# Patient Record
Sex: MALE | Race: BLACK OR AFRICAN AMERICAN | NOT HISPANIC OR LATINO | URBAN - METROPOLITAN AREA
[De-identification: names, ages, dates, MRNs, and addresses within clinical notes are randomized per-mention and may not be internally consistent; named-entity substitution may affect disease eponyms.]

---

## 2023-05-12 ENCOUNTER — OFFICE VISIT (OUTPATIENT)
Dept: INTERNAL MEDICINE | Facility: CLINIC | Age: 63
End: 2023-05-12
Payer: MEDICARE

## 2023-05-12 ENCOUNTER — HOSPITAL ENCOUNTER (OUTPATIENT)
Dept: RADIOLOGY | Facility: HOSPITAL | Age: 63
Discharge: HOME | End: 2023-05-12
Payer: MEDICARE

## 2023-05-12 VITALS
OXYGEN SATURATION: 97 % | DIASTOLIC BLOOD PRESSURE: 88 MMHG | HEART RATE: 100 BPM | WEIGHT: 313.69 LBS | SYSTOLIC BLOOD PRESSURE: 124 MMHG | BODY MASS INDEX: 50.41 KG/M2 | HEIGHT: 66 IN

## 2023-05-12 DIAGNOSIS — I10 PRIMARY HYPERTENSION: ICD-10-CM

## 2023-05-12 DIAGNOSIS — M17.0 OSTEOARTHRITIS OF BOTH KNEES, UNSPECIFIED OSTEOARTHRITIS TYPE: ICD-10-CM

## 2023-05-12 DIAGNOSIS — R73.03 PREDIABETES: ICD-10-CM

## 2023-05-12 DIAGNOSIS — Z00.00 ENCOUNTER FOR MEDICAL EXAMINATION TO ESTABLISH CARE: Primary | ICD-10-CM

## 2023-05-12 DIAGNOSIS — E78.5 DYSLIPIDEMIA: ICD-10-CM

## 2023-05-12 DIAGNOSIS — C18.7 MALIGNANT NEOPLASM OF SIGMOID COLON: ICD-10-CM

## 2023-05-12 DIAGNOSIS — Z12.5 ENCOUNTER FOR SCREENING FOR MALIGNANT NEOPLASM OF PROSTATE: ICD-10-CM

## 2023-05-12 DIAGNOSIS — Z00.00 HEALTHCARE MAINTENANCE: ICD-10-CM

## 2023-05-12 DIAGNOSIS — R94.6 BORDERLINE ABNORMAL TFTS: ICD-10-CM

## 2023-05-12 DIAGNOSIS — Z01.30 ENCOUNTER FOR BLOOD PRESSURE EXAMINATION: ICD-10-CM

## 2023-05-12 PROBLEM — E66.01 CLASS 3 SEVERE OBESITY IN ADULT: Status: ACTIVE | Noted: 2023-05-12

## 2023-05-12 PROBLEM — E66.813 CLASS 3 SEVERE OBESITY IN ADULT: Status: ACTIVE | Noted: 2023-05-12

## 2023-05-12 PROCEDURE — 73562 XR KNEE 3 VIEW BILATERAL: ICD-10-PCS | Mod: 26,50,, | Performed by: RADIOLOGY

## 2023-05-12 PROCEDURE — 99203 OFFICE O/P NEW LOW 30 MIN: CPT | Mod: S$PBB,GC,,

## 2023-05-12 PROCEDURE — 73562 X-RAY EXAM OF KNEE 3: CPT | Mod: 26,50,, | Performed by: RADIOLOGY

## 2023-05-12 PROCEDURE — 73562 X-RAY EXAM OF KNEE 3: CPT | Mod: TC,50

## 2023-05-12 PROCEDURE — 99999 PR PBB SHADOW E&M-NEW PATIENT-LVL IV: CPT | Mod: PBBFAC,GC,,

## 2023-05-12 PROCEDURE — 99203 PR OFFICE/OUTPT VISIT, NEW, LEVL III, 30-44 MIN: ICD-10-PCS | Mod: S$PBB,GC,,

## 2023-05-12 PROCEDURE — 99204 OFFICE O/P NEW MOD 45 MIN: CPT | Mod: PBBFAC

## 2023-05-12 PROCEDURE — 99999 PR PBB SHADOW E&M-NEW PATIENT-LVL IV: ICD-10-PCS | Mod: PBBFAC,GC,,

## 2023-05-12 RX ORDER — ACETAMINOPHEN 500 MG
TABLET ORAL
COMMUNITY

## 2023-05-12 RX ORDER — MELOXICAM 7.5 MG/1
7.5 TABLET ORAL DAILY
Qty: 90 TABLET | Refills: 0 | Status: SHIPPED | OUTPATIENT
Start: 2023-05-12 | End: 2023-07-05 | Stop reason: SDUPTHER

## 2023-05-12 RX ORDER — AMLODIPINE BESYLATE 10 MG/1
TABLET ORAL
COMMUNITY
End: 2023-09-25 | Stop reason: SDUPTHER

## 2023-05-12 RX ORDER — LIDOCAINE 50 MG/G
1 PATCH TOPICAL DAILY
Qty: 30 PATCH | Refills: 0 | Status: SHIPPED | OUTPATIENT
Start: 2023-05-12

## 2023-05-12 NOTE — PROGRESS NOTES
[facility]   Clinic Note    Patient Name: Bobby Tavarez   YOB: 1960     SUBJECTIVE:     Chief Complaint: Establish care and bilateral knee pain    History of Present Illness:  Mr. Bobby Tavarez is a 63 y.o. male with history of hypertension and osteoarthritis of bilateral knees, who presents today to establish care with me. Patient previously seen at [facility], has not been seen by a provider in our network. Patient recently retired and is doing generally well, however, complains of significant pain in both of his knees, though worse in the right side. Pt says he has recently put on a significant amount of weight within the last few years prior to [facility]. He also reports some issues with erectile function and low libido. He denies any recent fevers, chills, SOB, chest pain, or n/v/d. His issues are as follows:    HTN: On Amlodipine 10mg, BP has been well controlled on this. Pt has BP cuff at home and reports his home measurements are usually in the 120s/70s. Today his BP is 124/88. Says in the past, when his weight was lower, his BP was controlled without any medication.    OA of bilateral knees: Takes ibuprofen at home for his OA. Long history of working as  with a lot of repetitive motions with his legs and heavy vibrations. Has had x-rays done in the past that I cannot view that had demonstrated degenerative disease of his knees. He reports significant pain, worse in the right knee, with feelings of crackles and crepitus while walking or bending the knee. Per chart review, appears pt has tried mobic in the past, but he does not recall how effective it was. Pt's wife says he has tried lidocaine patches also, but pt also does not recall if they helped. Was previously prescribed PT but due to insurance changes, pt did not attend any PT sessions.    Low libido/erectile dysfunction: Pt reports having had some increased difficulty with these issues over  "the last few months. At present, pt not interested in taking any medications to treat.    Review of Systems   Constitutional:  Negative for chills and fever.   HENT:  Negative for congestion and sore throat.    Eyes:  Negative for double vision and photophobia.   Respiratory:  Negative for cough and shortness of breath.    Cardiovascular:  Negative for chest pain and palpitations.   Gastrointestinal:  Negative for abdominal pain and vomiting.   Genitourinary:  Negative for dysuria and urgency.   Musculoskeletal:  Positive for joint pain. Negative for myalgias and neck pain.   Neurological:  Negative for dizziness and weakness.   Psychiatric/Behavioral:  Negative for depression. The patient is not nervous/anxious.      PAST HISTORY:     Past Medical History:   Diagnosis Date    Essential (primary) hypertension     Obesity, morbid, BMI 50 or higher     Osteoarthritis of both knees        No past surgical history on file.    Family History   Problem Relation Age of Onset    Diabetes Mother        Social History     Socioeconomic History    Marital status:        MEDICATIONS & ALLERGIES:     Current Outpatient Medications on File Prior to Visit   Medication Sig    amLODIPine (NORVASC) 10 MG tablet amlodipine 10 mg tablet   TAKE 1 TABLET BY MOUTH ONCE DAILY    cholecalciferol, vitamin D3, (VITAMIN D3) 50 mcg (2,000 unit) Cap capsule Vitamin D3 50 mcg (2,000 unit) capsule   Take 1 capsule every day by oral route.     No current facility-administered medications on file prior to visit.       Review of patient's allergies indicates:  No Known Allergies    OBJECTIVE:     Vital Signs:  Vitals:    05/12/23 1531   BP: 124/88   BP Location: Left arm   Patient Position: Sitting   BP Method: Large (Manual)   Pulse: 100   SpO2: 97%   Weight: (!) 142.3 kg (313 lb 11.4 oz)   Height: 5' 6" (1.676 m)       Body mass index is 50.63 kg/m².     Physical Exam  Vitals reviewed.   Constitutional:       Appearance: Normal appearance. " He is obese.   HENT:      Head: Normocephalic and atraumatic.      Nose: No congestion or rhinorrhea.   Eyes:      Extraocular Movements: Extraocular movements intact.      Pupils: Pupils are equal, round, and reactive to light.   Cardiovascular:      Rate and Rhythm: Normal rate.      Pulses: Normal pulses.   Pulmonary:      Effort: Pulmonary effort is normal. No respiratory distress.   Abdominal:      General: Abdomen is flat.      Tenderness: There is no abdominal tenderness.   Musculoskeletal:         General: Tenderness present. Normal range of motion.      Comments: Crepitus of both knees  Some joint line tenderness   Skin:     General: Skin is warm.      Findings: No rash.   Neurological:      Mental Status: He is alert and oriented to person, place, and time. Mental status is at baseline.   Psychiatric:         Mood and Affect: Mood normal.         Behavior: Behavior normal.       Laboratory  Pending labs      Health Maintenance Due   Topic Date Due    Hepatitis C Screening  Never done    Lipid Panel  Never done    HIV Screening  Never done    Hemoglobin A1c (Diabetic Prevention Screening)  Never done    Colorectal Cancer Screening  Never done          HEALTH MAINTENANCE:   Immunizations: reported receiving tetanus vaccination at work, but unsure when, however believes within last 10 years.  - Declined other vaccinations today    Cancer Screening:  Colonoscopy: Ordered, patient will schedule  PSA: Ordered    ASSESSMENT & PLAN:   Mr. Bobby Tavarez is a 63 y.o. male w/PMH of HTN, OA of both knees, and obesity BMI 50 who presents today to establish care with me.  -     Will order routine bloodwork and follow up with patient once labs have resulted: CBC, CMP, Lipids, A1c, TSH, Hep C, HIV. Healthcare maintenance with colonoscopy and PSA.  - For his osteoarthritis, it appears this has been a longstanding issue that he has sought medical advice for in the past with different therapies trialed with no real lasting  success. Discussed with patient that since initial conservative approaches have not been successful, may need to try steroid injections or gel injections next. Depending on the response to those, surgery may be a consideration. Will refer to Orthopedics here for further evaluation and management. Will order Knee X-rays to further assess degree of degenerative disease.  - Strongly recommended weight loss for symptom relief. Also will re-trial mobic 7.5mg and lidocaine patches. Recommended to add Tylenol as well for multimodal pain approach.  - Regarding his low libido and ED, also emphasized that weight loss can help with both of these issues. Pt currently not interested in starting any medication for these issues.    Encounter for medical examination to establish care  -     CBC W/ AUTO DIFFERENTIAL; Future; Expected date: 05/12/2023  -     COMPREHENSIVE METABOLIC PANEL; Future; Expected date: 05/12/2023  -     LIPID PANEL; Future; Expected date: 05/12/2023  -     HEMOGLOBIN A1C; Future; Expected date: 05/12/2023  -     TSH; Future; Expected date: 05/12/2023  -     PSA, SCREENING; Future; Expected date: 05/12/2023  -     HIV 1/2 Ag/Ab (4th Gen); Future; Expected date: 05/12/2023  -     HEPATITIS C ANTIBODY; Future; Expected date: 05/12/2023    Osteoarthritis of both knees, unspecified osteoarthritis type  -     Ambulatory referral/consult to Orthopedics; Future; Expected date: 05/19/2023  -     meloxicam (MOBIC) 7.5 MG tablet; Take 1 tablet (7.5 mg total) by mouth once daily.  Dispense: 90 tablet; Refill: 0  -     X-Ray Knee 3 View Bilateral; Future; Expected date: 05/12/2023  -     LIDOcaine (LIDODERM) 5 %; Place 1 patch onto the skin once daily. Remove & Discard patch within 12 hours or as directed by MD  Dispense: 30 patch; Refill: 0    Will contact patient once labs and imaging have resulted.  Return to clinic in 3 months or sooner as needed.    Patient was discussed with Dr. Breonna Jean MD  Internal  Medicine, PGY-1  [facility]

## 2023-05-23 ENCOUNTER — OFFICE VISIT (OUTPATIENT)
Dept: ORTHOPEDICS | Facility: CLINIC | Age: 63
End: 2023-05-23
Payer: MEDICARE

## 2023-05-23 VITALS — BODY MASS INDEX: 52.48 KG/M2 | WEIGHT: 315 LBS | HEIGHT: 65 IN

## 2023-05-23 DIAGNOSIS — M25.561 BILATERAL CHRONIC KNEE PAIN: ICD-10-CM

## 2023-05-23 DIAGNOSIS — M17.0 PRIMARY OSTEOARTHRITIS OF BOTH KNEES: Primary | ICD-10-CM

## 2023-05-23 DIAGNOSIS — M25.562 BILATERAL CHRONIC KNEE PAIN: ICD-10-CM

## 2023-05-23 DIAGNOSIS — G89.29 BILATERAL CHRONIC KNEE PAIN: ICD-10-CM

## 2023-05-23 PROCEDURE — 99203 PR OFFICE/OUTPT VISIT, NEW, LEVL III, 30-44 MIN: ICD-10-PCS | Mod: S$PBB,25,, | Performed by: PHYSICIAN ASSISTANT

## 2023-05-23 PROCEDURE — 99203 OFFICE O/P NEW LOW 30 MIN: CPT | Mod: S$PBB,25,, | Performed by: PHYSICIAN ASSISTANT

## 2023-05-23 PROCEDURE — 20610 LARGE JOINT ASPIRATION/INJECTION: BILATERAL KNEE: ICD-10-PCS | Mod: 50,S$PBB,, | Performed by: PHYSICIAN ASSISTANT

## 2023-05-23 PROCEDURE — 20610 DRAIN/INJ JOINT/BURSA W/O US: CPT | Mod: 50,S$PBB,, | Performed by: PHYSICIAN ASSISTANT

## 2023-05-23 PROCEDURE — 99999 PR PBB SHADOW E&M-EST. PATIENT-LVL III: CPT | Mod: PBBFAC,,, | Performed by: PHYSICIAN ASSISTANT

## 2023-05-23 PROCEDURE — 99213 OFFICE O/P EST LOW 20 MIN: CPT | Mod: PBBFAC,25 | Performed by: PHYSICIAN ASSISTANT

## 2023-05-23 PROCEDURE — 20610 DRAIN/INJ JOINT/BURSA W/O US: CPT | Mod: 50,PBBFAC | Performed by: PHYSICIAN ASSISTANT

## 2023-05-23 PROCEDURE — 99999 PR PBB SHADOW E&M-EST. PATIENT-LVL III: ICD-10-PCS | Mod: PBBFAC,,, | Performed by: PHYSICIAN ASSISTANT

## 2023-05-23 RX ORDER — TRIAMCINOLONE ACETONIDE 40 MG/ML
40 INJECTION, SUSPENSION INTRA-ARTICULAR; INTRAMUSCULAR
Status: DISCONTINUED | OUTPATIENT
Start: 2023-05-23 | End: 2023-05-23 | Stop reason: HOSPADM

## 2023-05-23 RX ADMIN — TRIAMCINOLONE ACETONIDE 40 MG: 40 INJECTION, SUSPENSION INTRA-ARTICULAR; INTRAMUSCULAR at 11:05

## 2023-05-23 NOTE — PROCEDURES
Large Joint Aspiration/Injection: bilateral knee    Date/Time: 5/23/2023 11:00 AM  Performed by: Lorna Young PA-C  Authorized by: Lorna Young PA-C     Consent Done?:  Yes (Verbal)  Indications:  Pain  Prep: patient was prepped and draped in usual sterile fashion    Local anesthetic:  Topical anesthetic    Details:  Needle Size:  22 G  Approach:  Anterolateral  Location:  Knee  Laterality:  Bilateral  Site:  Bilateral knee  Medications (Right):  40 mg triamcinolone acetonide 40 mg/mL  Medications (Left):  40 mg triamcinolone acetonide 40 mg/mL  Patient tolerance:  Patient tolerated the procedure well with no immediate complications

## 2023-05-23 NOTE — PROGRESS NOTES
"  SUBJECTIVE:     Chief Complaint   Patient presents with    Left Knee - Pain    Right Knee - Pain       History of Present Illness:  Bobby Tavarez is a 63 y.o. year old male here with complaints of constant bilateral knee pain which started many years ago.  He was a - recently retired.  There is not a history of trauma.  The pain is located in the global aspect of the knee.  The pain is described as achy. He has some buckling and giveaway weakness. He recently started meloxicam- this has helped a little.  He has not had any prior treatment for his knees.  There is not a history of previous injury or surgery to the knee.  The patient does not use an assistive device.    Review of patient's allergies indicates:  No Known Allergies      Current Outpatient Medications   Medication Sig Dispense Refill    amLODIPine (NORVASC) 10 MG tablet amlodipine 10 mg tablet   TAKE 1 TABLET BY MOUTH ONCE DAILY      cholecalciferol, vitamin D3, (VITAMIN D3) 50 mcg (2,000 unit) Cap capsule Vitamin D3 50 mcg (2,000 unit) capsule   Take 1 capsule every day by oral route.      LIDOcaine (LIDODERM) 5 % Place 1 patch onto the skin once daily. Remove & Discard patch within 12 hours or as directed by MD 30 patch 0    meloxicam (MOBIC) 7.5 MG tablet Take 1 tablet (7.5 mg total) by mouth once daily. 90 tablet 0     No current facility-administered medications for this visit.       Past Medical History:   Diagnosis Date    Essential (primary) hypertension     Obesity, morbid, BMI 50 or higher     Osteoarthritis of both knees        No past surgical history on file.      Review of Systems:  ROS:  Constitutional: no fever or chills  Eyes: no visual changes  ENT: no nasal congestion or sore throat  Respiratory: no cough or shortness of breath  Musculoskeletal: no arthralgias or myalgias      OBJECTIVE:     PHYSICAL EXAM:  Height: 5' 5.35" (166 cm) Weight: (!) 148.6 kg (327 lb 9 oz)   General: Well developed, well nourished, " in no acute distress.  Neurological: Mood & affect are normal.  HEENT: NCAT, sclera nonicteric   Lungs: Respirations are equal and unlabored.   CV: 2+ bilateral upper and lower extremity pulses.   Skin: Intact throughout with no rashes, erythema, or lesions  Extremities: No LE edema, no erythema or warmth of the skin in either lower extremity.    bilateral Knee Exam:  Knee Range of Motion: 0-130   Effusion: none  Condition of skin: intact  Location of tenderness: None   Strength: 5 of 5 quadriceps strength and 5 of 5 hamstring strength  Stability:  stable to testing  Varus /Valgus stress:  normal    Bilateral Hip Examination:  full painless range of motion, without tenderness    IMAGING:    X-rays of the bilateral knee, personally reviewed by me, demonstrate severe degenerative changes with joint space narrowing, osteophyte formation and subchondral sclerosis.  No fracture or dislocation.     ASSESSMENT/PLAN:   63 y.o. year old male with bilateral knee osteoarthritis    Plan: We discussed with the patient at length all the different treatment options available for the knee including NSAIDS, acetaminophen, rest, ice, knee strengthening exercise, steroid injections for temporary relief, Viscosupplementation, and knee arthroplasty.   - He elected to proceed with diagnostic/therapeutic bilateral knee CSI today.  Recommend rest, ice as needed.   - Ok to continue meloxicam 7.5 mg.  Discussed increased to 15 mg if needed.   - Discussed TKA and ideal BMI of 40  - Follow up if symptoms worsen or fail to improve

## 2023-07-05 DIAGNOSIS — M17.0 OSTEOARTHRITIS OF BOTH KNEES, UNSPECIFIED OSTEOARTHRITIS TYPE: ICD-10-CM

## 2023-07-06 RX ORDER — MELOXICAM 7.5 MG/1
7.5 TABLET ORAL DAILY
Qty: 90 TABLET | Refills: 1 | Status: SHIPPED | OUTPATIENT
Start: 2023-07-06 | End: 2023-09-25 | Stop reason: SDUPTHER

## 2023-09-25 ENCOUNTER — OFFICE VISIT (OUTPATIENT)
Dept: INTERNAL MEDICINE | Facility: CLINIC | Age: 63
End: 2023-09-25
Payer: MEDICARE

## 2023-09-25 VITALS
DIASTOLIC BLOOD PRESSURE: 88 MMHG | HEIGHT: 66 IN | WEIGHT: 301.56 LBS | BODY MASS INDEX: 48.47 KG/M2 | SYSTOLIC BLOOD PRESSURE: 115 MMHG

## 2023-09-25 DIAGNOSIS — Z00.00 HEALTH CARE MAINTENANCE: Primary | ICD-10-CM

## 2023-09-25 DIAGNOSIS — K59.00 CONSTIPATION, UNSPECIFIED CONSTIPATION TYPE: ICD-10-CM

## 2023-09-25 DIAGNOSIS — I10 HYPERTENSION, UNSPECIFIED TYPE: ICD-10-CM

## 2023-09-25 DIAGNOSIS — M17.0 OSTEOARTHRITIS OF BOTH KNEES, UNSPECIFIED OSTEOARTHRITIS TYPE: ICD-10-CM

## 2023-09-25 DIAGNOSIS — E78.5 HYPERLIPIDEMIA, UNSPECIFIED HYPERLIPIDEMIA TYPE: ICD-10-CM

## 2023-09-25 DIAGNOSIS — R73.03 PREDIABETES: ICD-10-CM

## 2023-09-25 PROCEDURE — 99999 PR PBB SHADOW E&M-EST. PATIENT-LVL III: CPT | Mod: PBBFAC,GC,,

## 2023-09-25 PROCEDURE — 99999 PR PBB SHADOW E&M-EST. PATIENT-LVL III: ICD-10-PCS | Mod: PBBFAC,GC,,

## 2023-09-25 PROCEDURE — 99213 PR OFFICE/OUTPT VISIT, EST, LEVL III, 20-29 MIN: ICD-10-PCS | Mod: S$PBB,GC,,

## 2023-09-25 PROCEDURE — 99213 OFFICE O/P EST LOW 20 MIN: CPT | Mod: S$PBB,GC,,

## 2023-09-25 PROCEDURE — 99213 OFFICE O/P EST LOW 20 MIN: CPT | Mod: PBBFAC

## 2023-09-25 RX ORDER — AMLODIPINE BESYLATE 10 MG/1
10 TABLET ORAL DAILY
Qty: 90 TABLET | Refills: 3 | Status: SHIPPED | OUTPATIENT
Start: 2023-09-25 | End: 2023-11-14 | Stop reason: SDUPTHER

## 2023-09-25 RX ORDER — METFORMIN HYDROCHLORIDE 500 MG/1
500 TABLET, EXTENDED RELEASE ORAL
Qty: 90 TABLET | Refills: 1 | Status: SHIPPED | OUTPATIENT
Start: 2023-09-25 | End: 2024-03-12 | Stop reason: SDUPTHER

## 2023-09-25 RX ORDER — ATORVASTATIN CALCIUM 40 MG/1
40 TABLET, FILM COATED ORAL DAILY
Qty: 90 TABLET | Refills: 1 | Status: SHIPPED | OUTPATIENT
Start: 2023-09-25 | End: 2024-03-23

## 2023-09-25 RX ORDER — SEMAGLUTIDE 0.68 MG/ML
0.25 INJECTION, SOLUTION SUBCUTANEOUS
Qty: 3 ML | Refills: 0 | Status: SHIPPED | OUTPATIENT
Start: 2023-09-25

## 2023-09-25 RX ORDER — MELOXICAM 7.5 MG/1
7.5 TABLET ORAL DAILY
Qty: 90 TABLET | Refills: 3 | Status: SHIPPED | OUTPATIENT
Start: 2023-09-25

## 2023-09-25 RX ORDER — SENNOSIDES 8.6 MG/1
1 TABLET ORAL DAILY
Qty: 90 TABLET | Refills: 1 | Status: SHIPPED | OUTPATIENT
Start: 2023-09-25

## 2023-09-25 NOTE — PROGRESS NOTES
[facility]   Clinic Note    Patient Name: Bobby Tavarez   YOB: 1960     SUBJECTIVE:     Chief Complaint: 3-4 Month follow-up    History of Present Illness:  Mr. Bobby Tavarez is a 63 y.o. male with history of HLD, HTN, OA of bilateral knees and prediabetes who presents today for 3 month follow-up. Patient is doing generally well, enjoying group home and keeping busy with his adult children. His chronic issues are as follows:    HTN: Well controlled and uses a home BP cuff. Wife reports that SBP is typically in the 110s-120s. On Amlodipine 10mg, stable.    HLD: On last visit, lipids were ordered that showed total chol 193 and .    OA: Bilateral knee OA, referred to orthopedics on last visit. Saw ortho and received CSI in both knees, pt reports significant improvement in symptoms and with good response to steroid injection. On Mobic 7.5 and doing well on Mobic. He is also endorsing some pain in his lower back and the back of his legs, bilaterally.    Prediabetes and Obesity, BMI 48: Patient today and wife expressing interest in possible weight loss medication of Ozempic given his a1c of 5.7. Pt has been trying to lose some weight and has made some lifestyle adjustments, but still has not really changed his diet.    Constipation: Reporting some ongoing constipation for the past month. Has tried OTC supplements without help. No warning signs or blood in stool reported.    Review of Systems   Constitutional:  Negative for chills and fever.   HENT:  Negative for congestion and sore throat.    Eyes:  Negative for double vision and photophobia.   Respiratory:  Negative for cough and shortness of breath.    Cardiovascular:  Negative for chest pain and palpitations.   Gastrointestinal:  Negative for abdominal pain and vomiting.   Genitourinary:  Negative for dysuria and urgency.   Musculoskeletal:  Positive for joint pain and myalgias. Negative for neck pain.   Neurological:   "Negative for dizziness and weakness.   Psychiatric/Behavioral:  Negative for depression. The patient is not nervous/anxious.        PAST HISTORY:     Past Medical History:   Diagnosis Date    Essential (primary) hypertension     Hyperlipidemia     Obesity, morbid, BMI 50 or higher     Osteoarthritis of both knees     Prediabetes        No past surgical history on file.    Family History   Problem Relation Age of Onset    Diabetes Mother        Social History     Socioeconomic History    Marital status:    Occupational History    Occupation: , retired   Tobacco Use    Smoking status: Never    Smokeless tobacco: Never   Substance and Sexual Activity    Alcohol use: Never    Drug use: Never       MEDICATIONS & ALLERGIES:     Current Outpatient Medications on File Prior to Visit   Medication Sig    cholecalciferol, vitamin D3, (VITAMIN D3) 50 mcg (2,000 unit) Cap capsule Vitamin D3 50 mcg (2,000 unit) capsule   Take 1 capsule every day by oral route.    LIDOcaine (LIDODERM) 5 % Place 1 patch onto the skin once daily. Remove & Discard patch within 12 hours or as directed by MD    [DISCONTINUED] amLODIPine (NORVASC) 10 MG tablet amlodipine 10 mg tablet   TAKE 1 TABLET BY MOUTH ONCE DAILY    [DISCONTINUED] meloxicam (MOBIC) 7.5 MG tablet Take 1 tablet (7.5 mg total) by mouth once daily.     No current facility-administered medications on file prior to visit.       Review of patient's allergies indicates:  No Known Allergies    OBJECTIVE:     Vital Signs:  Vitals:    09/25/23 1423 09/25/23 1424   BP: (!) 118/90 115/88   BP Location: Right arm    Weight: (!) 136.8 kg (301 lb 9.4 oz)    Height: 5' 6" (1.676 m)        Body mass index is 48.68 kg/m².     Physical Exam  Vitals reviewed.   Constitutional:       Appearance: Normal appearance. He is obese.   HENT:      Head: Normocephalic and atraumatic.      Nose: No congestion or rhinorrhea.   Eyes:      Extraocular Movements: Extraocular movements " "intact.      Pupils: Pupils are equal, round, and reactive to light.   Cardiovascular:      Rate and Rhythm: Normal rate and regular rhythm.      Pulses: Normal pulses.      Heart sounds: Normal heart sounds.   Pulmonary:      Effort: Pulmonary effort is normal. No respiratory distress.      Breath sounds: Normal breath sounds.   Abdominal:      General: Abdomen is flat.      Palpations: Abdomen is soft.      Tenderness: There is no abdominal tenderness.   Musculoskeletal:         General: No swelling, tenderness or deformity. Normal range of motion.   Skin:     General: Skin is warm.      Findings: No rash.   Neurological:      Mental Status: He is alert and oriented to person, place, and time. Mental status is at baseline.   Psychiatric:         Mood and Affect: Mood normal.         Behavior: Behavior normal.         Laboratory  Lab Results   Component Value Date    WBC 6.31 05/12/2023    HGB 14.3 05/12/2023    HCT 43.5 05/12/2023    MCV 83 05/12/2023     05/12/2023     BMP  Lab Results   Component Value Date     05/12/2023    K 3.7 05/12/2023     05/12/2023    CO2 24 05/12/2023    BUN 9 05/12/2023    CREATININE 1.1 05/12/2023    CALCIUM 9.2 05/12/2023    ANIONGAP 11 05/12/2023    EGFRNORACEVR >60.0 05/12/2023     No results found for: "INR", "PROTIME"  Lab Results   Component Value Date    HGBA1C 5.7 (H) 05/12/2023         Health Maintenance Due   Topic Date Due    Colorectal Cancer Screening  Never done    Influenza Vaccine (1) Never done          HEALTH MAINTENANCE:   Immunizations:   Up to date, deferred flu at this time     Cancer Screening:  Colonoscopy: Scheduled today.    ASSESSMENT & PLAN:   Mr. Bobby Tavarez is a 63 y.o. male w/PMH of HTN, HLD, prediabetes who presents for follow-up.  -     Doing generally well. I told the patient when his last labs were done that I wanted to start him on a statin. ASCVD calculator was done with the pt and his wife that showed 14.6% 10 year risk of " ASCVD event, I recommended starting atorvastatin 40 for risk modification.   - Pt with a1c 5.7, prediabetes. Pt's wife inquiring about Ozempic for weight loss and prediabetes. Explained that it may be difficult for the patient's insurance to accept the prescription, but given his severe obesity in setting of prediabetes, was worth trying to send the prescription. Additionally, given his comorbidities, I recommended starting Metformin 500 daily for his prediabetes and cardiovascular risk reduction. May also have weight loss benefits.  - Scheduled colonoscopy. Pt also with constipation, but I explained that he should take things like miralax daily and not just intermittently for maximal effect. I will put in for senna for stool softening given that he reports some straining with Bms.    Health care maintenance  -     Ambulatory referral/consult to Endo Procedure ; Future; Expected date: 09/26/2023    Osteoarthritis of both knees, unspecified osteoarthritis type  -     meloxicam (MOBIC) 7.5 MG tablet; Take 1 tablet (7.5 mg total) by mouth once daily.  Dispense: 90 tablet; Refill: 3    Prediabetes  -     metFORMIN (GLUCOPHAGE-XR) 500 MG ER 24hr tablet; Take 1 tablet (500 mg total) by mouth daily with breakfast.  Dispense: 90 tablet; Refill: 1  -     semaglutide (OZEMPIC) 0.25 mg or 0.5 mg (2 mg/3 mL) pen injector; Inject 0.25 mg into the skin every 7 days.  Dispense: 3 mL; Refill: 0    Constipation, unspecified constipation type  -     SENNA 8.6 mg tablet; Take 1 tablet by mouth once daily.  Dispense: 90 tablet; Refill: 1    Hypertension, unspecified type  -     amLODIPine (NORVASC) 10 MG tablet; Take 1 tablet (10 mg total) by mouth once daily.  Dispense: 90 tablet; Refill: 3    Hyperlipidemia, unspecified hyperlipidemia type  -     atorvastatin (LIPITOR) 40 MG tablet; Take 1 tablet (40 mg total) by mouth once daily.  Dispense: 90 tablet; Refill: 1        Return to clinic in 6 months, around April, or sooner as  needed.    Patient was discussed with Dr. Khan.    Jame Jean MD  Internal Medicine, PGY-2  [facility]

## 2023-09-27 NOTE — PROGRESS NOTES
I have personally discussed  this patient and agree with the resident, and agree with  their note as stated above with the following thoughts:    Will refill meds-  Can try ozempic for prediabetes but insurance may not cover

## 2023-10-02 ENCOUNTER — TELEPHONE (OUTPATIENT)
Dept: ENDOSCOPY | Facility: HOSPITAL | Age: 63
End: 2023-10-02

## 2023-10-02 ENCOUNTER — CLINICAL SUPPORT (OUTPATIENT)
Dept: ENDOSCOPY | Facility: HOSPITAL | Age: 63
End: 2023-10-02
Payer: MEDICARE

## 2023-10-02 DIAGNOSIS — Z12.11 SCREEN FOR COLON CANCER: Primary | ICD-10-CM

## 2023-10-02 DIAGNOSIS — Z00.00 HEALTH CARE MAINTENANCE: ICD-10-CM

## 2023-10-02 NOTE — TELEPHONE ENCOUNTER
Spoke to pt to schedule procedure(s) Colonoscopy       Physician to perform procedure(s) Dr. JUWAN Garcia  Date of Procedure (s) 10/6/23  Arrival Time 11:30 AM  Time of Procedure(s) 12:30 PM   Location of Procedure(s) [place] [address]th Floor  Type of Rx Prep sent to patient: PEG extended  Instructions provided to patient via Email    Patient was informed on the following information and verbalized understanding. Screening questionnaire reviewed with patient and complete. If procedure requires anesthesia, a responsible adult needs to be present to accompany the patient home, patient cannot drive after receiving anesthesia. Appointment details are tentative, especially check-in time. Patient will receive a prep-op call 4 days prior to confirm check-in time for procedure. If applicable the patient should contact their pharmacy to verify Rx for procedure prep is ready for pick-up. Patient was advised to call the scheduling department at [phone] if pharmacy states no Rx is available. Patient was advised to call the endoscopy scheduling department if any questions or concerns arise.      [facility] Scheduling Department

## 2023-10-06 ENCOUNTER — ANESTHESIA EVENT (OUTPATIENT)
Dept: ENDOSCOPY | Facility: HOSPITAL | Age: 63
End: 2023-10-06

## 2023-10-06 ENCOUNTER — ANESTHESIA (OUTPATIENT)
Dept: ENDOSCOPY | Facility: HOSPITAL | Age: 63
End: 2023-10-06
Payer: MEDICARE

## 2023-10-06 ENCOUNTER — HOSPITAL ENCOUNTER (OUTPATIENT)
Facility: HOSPITAL | Age: 63
Discharge: HOME | End: 2023-10-06
Attending: INTERNAL MEDICINE | Admitting: INTERNAL MEDICINE
Payer: MEDICARE

## 2023-10-06 VITALS
TEMPERATURE: 98 F | BODY MASS INDEX: 48.21 KG/M2 | OXYGEN SATURATION: 100 % | RESPIRATION RATE: 18 BRPM | WEIGHT: 300 LBS | DIASTOLIC BLOOD PRESSURE: 52 MMHG | SYSTOLIC BLOOD PRESSURE: 91 MMHG | HEART RATE: 62 BPM | HEIGHT: 66 IN

## 2023-10-06 DIAGNOSIS — Z12.11 ENCOUNTER FOR SCREENING COLONOSCOPY: ICD-10-CM

## 2023-10-06 DIAGNOSIS — K63.5 POLYP OF COLON, UNSPECIFIED PART OF COLON, UNSPECIFIED TYPE: Primary | ICD-10-CM

## 2023-10-06 LAB — POCT GLUCOSE: 69 MG/DL (ref 70–110)

## 2023-10-06 PROCEDURE — 27201012 HC FORCEPS, HOT/COLD, DISP: Performed by: INTERNAL MEDICINE

## 2023-10-06 PROCEDURE — D9220A PRA ANESTHESIA: Mod: PT,CRNA,, | Performed by: NURSE ANESTHETIST, CERTIFIED REGISTERED

## 2023-10-06 PROCEDURE — 45385 PR COLONOSCOPY,REMV LESN,SNARE: ICD-10-PCS | Mod: PT,,, | Performed by: INTERNAL MEDICINE

## 2023-10-06 PROCEDURE — 45385 COLONOSCOPY W/LESION REMOVAL: CPT | Mod: PT | Performed by: INTERNAL MEDICINE

## 2023-10-06 PROCEDURE — 45380 PR COLONOSCOPY,BIOPSY: ICD-10-PCS | Mod: 59,PT,, | Performed by: INTERNAL MEDICINE

## 2023-10-06 PROCEDURE — 27202363 HC INJECTION AGENT, SUBMUCOSAL, ANY: Performed by: INTERNAL MEDICINE

## 2023-10-06 PROCEDURE — 45380 COLONOSCOPY AND BIOPSY: CPT | Mod: 59,PT | Performed by: INTERNAL MEDICINE

## 2023-10-06 PROCEDURE — D9220A PRA ANESTHESIA: Mod: PT,ANES,, | Performed by: SURGERY

## 2023-10-06 PROCEDURE — 88305 TISSUE EXAM BY PATHOLOGIST: CPT | Mod: 26,,, | Performed by: PATHOLOGY

## 2023-10-06 PROCEDURE — D9220A PRA ANESTHESIA: ICD-10-PCS | Mod: PT,ANES,, | Performed by: SURGERY

## 2023-10-06 PROCEDURE — 45385 COLONOSCOPY W/LESION REMOVAL: CPT | Mod: PT,,, | Performed by: INTERNAL MEDICINE

## 2023-10-06 PROCEDURE — D9220A PRA ANESTHESIA: ICD-10-PCS | Mod: PT,CRNA,, | Performed by: NURSE ANESTHETIST, CERTIFIED REGISTERED

## 2023-10-06 PROCEDURE — 27200997: Performed by: INTERNAL MEDICINE

## 2023-10-06 PROCEDURE — 45381 COLONOSCOPY SUBMUCOUS NJX: CPT | Mod: PT | Performed by: INTERNAL MEDICINE

## 2023-10-06 PROCEDURE — 63600175 PHARM REV CODE 636 W HCPCS: Performed by: NURSE ANESTHETIST, CERTIFIED REGISTERED

## 2023-10-06 PROCEDURE — 37000008 HC ANESTHESIA 1ST 15 MINUTES: Performed by: INTERNAL MEDICINE

## 2023-10-06 PROCEDURE — 88305 TISSUE EXAM BY PATHOLOGIST: CPT | Mod: 59 | Performed by: PATHOLOGY

## 2023-10-06 PROCEDURE — 45381 PR COLONOSCPY,FLEX,W/DIR SUBMUC INJECT: ICD-10-PCS | Mod: PT,,, | Performed by: INTERNAL MEDICINE

## 2023-10-06 PROCEDURE — 27201028 HC NEEDLE, SCLERO: Performed by: INTERNAL MEDICINE

## 2023-10-06 PROCEDURE — 45381 COLONOSCOPY SUBMUCOUS NJX: CPT | Mod: PT,,, | Performed by: INTERNAL MEDICINE

## 2023-10-06 PROCEDURE — 25000003 PHARM REV CODE 250: Performed by: NURSE ANESTHETIST, CERTIFIED REGISTERED

## 2023-10-06 PROCEDURE — 88305 TISSUE EXAM BY PATHOLOGIST: ICD-10-PCS | Mod: 26,,, | Performed by: PATHOLOGY

## 2023-10-06 PROCEDURE — 37000009 HC ANESTHESIA EA ADD 15 MINS: Performed by: INTERNAL MEDICINE

## 2023-10-06 PROCEDURE — 25000003 PHARM REV CODE 250: Performed by: INTERNAL MEDICINE

## 2023-10-06 PROCEDURE — 82962 GLUCOSE BLOOD TEST: CPT | Performed by: INTERNAL MEDICINE

## 2023-10-06 PROCEDURE — 45380 COLONOSCOPY AND BIOPSY: CPT | Mod: 59,PT,, | Performed by: INTERNAL MEDICINE

## 2023-10-06 PROCEDURE — 27201089 HC SNARE, DISP (ANY): Performed by: INTERNAL MEDICINE

## 2023-10-06 RX ORDER — SODIUM CHLORIDE 0.9 % (FLUSH) 0.9 %
10 SYRINGE (ML) INJECTION
Status: DISCONTINUED | OUTPATIENT
Start: 2023-10-06 | End: 2023-10-06 | Stop reason: HOSPADM

## 2023-10-06 RX ORDER — LIDOCAINE HYDROCHLORIDE 20 MG/ML
INJECTION INTRAVENOUS
Status: DISCONTINUED | OUTPATIENT
Start: 2023-10-06 | End: 2023-10-06

## 2023-10-06 RX ORDER — SODIUM CHLORIDE 9 MG/ML
INJECTION, SOLUTION INTRAVENOUS CONTINUOUS
Status: DISCONTINUED | OUTPATIENT
Start: 2023-10-06 | End: 2023-10-06 | Stop reason: HOSPADM

## 2023-10-06 RX ORDER — PROPOFOL 10 MG/ML
VIAL (ML) INTRAVENOUS
Status: DISCONTINUED | OUTPATIENT
Start: 2023-10-06 | End: 2023-10-06

## 2023-10-06 RX ADMIN — LIDOCAINE HYDROCHLORIDE 100 MG: 20 INJECTION INTRAVENOUS at 12:10

## 2023-10-06 RX ADMIN — PROPOFOL 80 MG: 10 INJECTION, EMULSION INTRAVENOUS at 12:10

## 2023-10-06 RX ADMIN — SODIUM CHLORIDE: 0.9 INJECTION, SOLUTION INTRAVENOUS at 12:10

## 2023-10-06 NOTE — ANESTHESIA PREPROCEDURE EVALUATION
[facility]  Anesthesia Pre-Operative Evaluation         Patient Name: Bobby Tavarez  YOB: 1960  MRN: [id number]    SUBJECTIVE:     Pre-operative evaluation for Procedure(s) (LRB):  COLONOSCOPY (N/A)     10/06/2023    Bobby Tavarez is a 63 y.o. male w/ a significant PMHx of  HLD, HTN, OA of bilateral knees and prediabetes.    Patient now presents for the above procedure(s).        Drips: None documented.   sodium chloride 0.9%          Patient Active Problem List   Diagnosis    Primary hypertension    Osteoarthritis of both knees    Class 3 severe obesity in adult    Hyperlipidemia    Prediabetes       Review of patient's allergies indicates:  No Known Allergies    Current Outpatient Medications:    Current Facility-Administered Medications:     0.9%  NaCl infusion, , Intravenous, Continuous, Edwina Garcia MD    History reviewed. No pertinent surgical history.    Social History     Socioeconomic History    Marital status:    Occupational History    Occupation: , retired   Tobacco Use    Smoking status: Never    Smokeless tobacco: Never   Substance and Sexual Activity    Alcohol use: Never    Drug use: Never       OBJECTIVE:     Vital Signs Range (Last 24H):         Significant Labs:  Lab Results   Component Value Date    WBC 6.31 05/12/2023    HGB 14.3 05/12/2023    HCT 43.5 05/12/2023     05/12/2023    CHOL 193 05/12/2023    TRIG 59 05/12/2023    HDL 41 05/12/2023    ALT 24 05/12/2023    AST 22 05/12/2023     05/12/2023    K 3.7 05/12/2023     05/12/2023    CREATININE 1.1 05/12/2023    BUN 9 05/12/2023    CO2 24 05/12/2023    TSH 1.459 05/12/2023    PSA 0.36 05/12/2023    HGBA1C 5.7 (H) 05/12/2023       Diagnostic Studies: No relevant studies.    EKG:   No results found for this or any previous visit.    2D ECHO:  TTE:  No results found for this or any previous visit.    DIMITRIOS:  No results found for this or any  previous visit.    ASSESSMENT/PLAN:           Pre-op Assessment    I have reviewed the Patient Summary Reports.     I have reviewed the Nursing Notes. I have reviewed the NPO Status.   I have reviewed the Medications.     Review of Systems  Anesthesia Hx:  No previous Anesthesia  Denies Family Hx of Anesthesia complications.    Social:  Non-Smoker    Hematology/Oncology:  Hematology Normal   Oncology Normal     EENT/Dental:EENT/Dental Normal   Cardiovascular:   Hypertension    Pulmonary:  Pulmonary Normal    Renal/:  Renal/ Normal     Hepatic/GI:  Hepatic/GI Normal    Musculoskeletal:   Arthritis     Neurological:  Neurology Normal    Endocrine:  Obesity / BMI > 30  Dermatological:  Skin Normal    Psych:  Psychiatric Normal           Physical Exam  General: Well nourished, Cooperative, Alert and Oriented    Airway:  Mallampati: II / I  Mouth Opening: Normal  TM Distance: Normal  Tongue: Normal  Neck ROM: Normal ROM    Dental:  Intact    Chest/Lungs:  Clear to auscultation, Normal Respiratory Rate    Heart:  Rate: Normal  Rhythm: Regular Rhythm        Anesthesia Plan  Type of Anesthesia, risks & benefits discussed:    Anesthesia Type: Gen Natural Airway  Intra-op Monitoring Plan: Standard ASA Monitors  Post Op Pain Control Plan: multimodal analgesia and IV/PO Opioids PRN  Induction:  IV  Informed Consent: Informed consent signed with the Patient and all parties understand the risks and agree with anesthesia plan.  All questions answered.   ASA Score: 2  Day of Surgery Review of History & Physical: H&P Update referred to the surgeon/provider.    Ready For Surgery From Anesthesia Perspective.     .

## 2023-10-06 NOTE — PROVATION PATIENT INSTRUCTIONS
Discharge Summary/Instructions after an Endoscopic Procedure  Patient Name: Bobby Tavarez  Patient MRN: [id number]  Patient YOB: 1960 Friday, October 6, 2023  Edwina Garcia MD  Dear patient,  As a result of recent federal legislation (The Federal Cures Act), you may   receive lab or pathology results from your procedure in your [facility]   account before your physician is able to contact you. Your physician or   their representative will relay the results to you with their   recommendations at their soonest availability.  Thank you,  RESTRICTIONS:  During your procedure today, you received medications for sedation.  These   medications may affect your judgment, balance and coordination.  Therefore,   for 24 hours, you have the following restrictions:   - DO NOT drive a car, operate machinery, make legal/financial decisions,   sign important papers or drink alcohol.    ACTIVITY:  Today: no heavy lifting, straining or running due to procedural   sedation/anesthesia.  The following day: return to full activity including work.  DIET:  Eat and drink normally unless instructed otherwise.     TREATMENT FOR COMMON SIDE EFFECTS:  - Mild abdominal pain, nausea, belching, bloating or excessive gas:  rest,   eat lightly and use a heating pad.  - Sore Throat: treat with throat lozenges and/or gargle with warm salt   water.  - Because air was used during the procedure, expelling large amounts of air   from your rectum or belching is normal.  - If a bowel prep was taken, you may not have a bowel movement for 1-3 days.    This is normal.  SYMPTOMS TO WATCH FOR AND REPORT TO YOUR PHYSICIAN:  1. Abdominal pain or bloating, other than gas cramps.  2. Chest pain.  3. Back pain.  4. Signs of infection such as: chills or fever occurring within 24 hours   after the procedure.  5. Rectal bleeding, which would show as bright red, maroon, or black stools.   (A tablespoon of blood from the rectum is not serious, especially if    hemorrhoids are present.)  6. Vomiting.  7. Weakness or dizziness.  GO DIRECTLY TO THE NEAREST EMERGENCY ROOM IF YOU HAVE ANY OF THE FOLLOWING:      Difficulty breathing              Chills and/or fever over 101 F   Persistent vomiting and/or vomiting blood   Severe abdominal pain   Severe chest pain   Black, tarry stools   Bleeding- more than one tablespoon   Any other symptom or condition that you feel may need urgent attention  Your doctor recommends these additional instructions:  If any biopsies were taken, your doctors clinic will contact you in 1 to 2   weeks with any results.  - Discharge patient to home.   - Patient has a contact number available for emergencies.  The signs and   symptoms of potential delayed complications were discussed with the   patient.  Return to normal activities tomorrow.  Written discharge   instructions were provided to the patient.   - Resume previous diet.   - Continue present medications.   - Await pathology results.   - Repeat colonoscopy for surveillance based on pathology results.  For questions, problems or results please call your physician - Edwina Garcia MD at Work:  [phone].  [facility], EMERGENCY ROOM PHONE NUMBER: [phone]  IF A COMPLICATION OR EMERGENCY SITUATION ARISES AND YOU ARE UNABLE TO REACH   YOUR PHYSICIAN - GO DIRECTLY TO THE EMERGENCY ROOM.  Edwina Garcia MD  10/6/2023 1:39:02 PM  This report has been verified and signed electronically.  Dear patient,  As a result of recent federal legislation (The Federal Cures Act), you may   receive lab or pathology results from your procedure in your [facility]   account before your physician is able to contact you. Your physician or   their representative will relay the results to you with their   recommendations at their soonest availability.  Thank you,  PROVATION

## 2023-10-06 NOTE — TRANSFER OF CARE
"Anesthesia Transfer of Care Note    Patient: Bobby Tavarez    Procedure(s) Performed: Procedure(s) (LRB):  COLONOSCOPY (N/A)    Patient location: GI    Anesthesia Type: general    Transport from OR: Transported from OR on room air with adequate spontaneous ventilation    Post pain: adequate analgesia    Post assessment: no apparent anesthetic complications and tolerated procedure well    Post vital signs: stable    Level of consciousness: awake and alert    Nausea/Vomiting: no nausea/vomiting    Complications: none    Transfer of care protocol was followed      Last vitals:   Visit Vitals  /74 (BP Location: Left arm, Patient Position: Lying)   Pulse 79   Temp 36.7 °C (98.1 °F) (Temporal)   Resp 17   Ht 5' 6" (1.676 m)   Wt 136.1 kg (300 lb)   SpO2 98%   BMI 48.42 kg/m²     "

## 2023-10-06 NOTE — ANESTHESIA POSTPROCEDURE EVALUATION
Anesthesia Post Evaluation    Patient: Bobby Tavarez    Procedure(s) Performed: Procedure(s) (LRB):  COLONOSCOPY (N/A)    Final Anesthesia Type: general      Patient location during evaluation: [facility]  Patient participation: Yes- Able to Participate  Level of consciousness: awake and alert  Post-procedure vital signs: reviewed and stable  Pain management: adequate  Airway patency: patent  LUKAS mitigation strategies: Multimodal analgesia, Preoperative use of mandibular advancement devices or oral appliances and Intraoperative administration of CPAP, nasopharyngeal airway, or oral appliance during sedation  PONV status at discharge: No PONV  Anesthetic complications: no      Cardiovascular status: blood pressure returned to baseline, hemodynamically stable and stable  Respiratory status: unassisted and spontaneous ventilation  Hydration status: euvolemic  Follow-up not needed.          Vitals Value Taken Time   /72 10/06/23 1432   Temp 36.7 °C (98 °F) 10/06/23 1336   Pulse 58 10/06/23 1434   Resp 18 10/06/23 1430   SpO2 97 % 10/06/23 1432   Vitals shown include unvalidated device data.      No case tracking events are documented in the log.      Pain/Aleena Score: Aleena Score: 10 (10/6/2023  2:00 PM)

## 2023-10-09 LAB — POCT GLUCOSE: 79 MG/DL (ref 70–110)

## 2023-10-11 LAB
FINAL PATHOLOGIC DIAGNOSIS: NORMAL
GROSS: NORMAL
Lab: NORMAL

## 2023-10-13 ENCOUNTER — TELEPHONE (OUTPATIENT)
Dept: GASTROENTEROLOGY | Facility: CLINIC | Age: 63
End: 2023-10-13
Payer: MEDICARE

## 2023-10-13 NOTE — TELEPHONE ENCOUNTER
Called pt no answer. Called pt wife who accompanied him after colonoscopy  Reviewed path from colonoscopy and concerning lesion c/w villous adenoma but not cancer  Will refer pt to advanced endoscopist to get scheduled for a colonoscopy with EMR.      Message routed to Dr. Arreguin and Blanquita Harkins

## 2023-10-16 DIAGNOSIS — Z12.11 COLON CANCER SCREENING: Primary | ICD-10-CM

## 2023-10-20 ENCOUNTER — TELEPHONE (OUTPATIENT)
Dept: ENDOSCOPY | Facility: HOSPITAL | Age: 63
End: 2023-10-20
Payer: MEDICARE

## 2023-10-20 DIAGNOSIS — Z12.11 SCREEN FOR COLON CANCER: Primary | ICD-10-CM

## 2023-10-20 NOTE — TELEPHONE ENCOUNTER
Spoke to pt's wife, Mary, to schedule procedure(s) Colonoscopy/EMR       Physician to perform procedure(s) Dr. SOHEILA Saleh  Date of Procedure (s) 11/15/23  Arrival Time 11:30 AM  Time of Procedure(s) 12:30 PM   Location of Procedure(s) [address]  Type of Rx Prep sent to patient: PEG Extended prep as pt has history of constipation and took an extended prep for his colonoscopy on 10/6/23 with Dr. JUWAN Garcia with good quality of prep per her report  Instructions provided to patient via [facility]    Patient was informed on the following information and verbalized understanding. Screening questionnaire reviewed with patient and complete. If procedure requires anesthesia, a responsible adult needs to be present to accompany the patient home, patient cannot drive after receiving anesthesia. Appointment details are tentative, especially check-in time. Patient will receive a prep-op call 4 days prior to confirm check-in time for procedure. If applicable the patient should contact their pharmacy to verify Rx for procedure prep is ready for pick-up. Patient was advised to call the scheduling department at [phone] if pharmacy states no Rx is available. Patient was advised to call the endoscopy scheduling department if any questions or concerns arise.       Endoscopy Scheduling Department

## 2023-11-14 DIAGNOSIS — I10 HYPERTENSION, UNSPECIFIED TYPE: ICD-10-CM

## 2023-11-14 RX ORDER — AMLODIPINE BESYLATE 10 MG/1
10 TABLET ORAL DAILY
Qty: 90 TABLET | Refills: 3 | Status: SHIPPED | OUTPATIENT
Start: 2023-11-14

## 2023-11-15 ENCOUNTER — ANESTHESIA EVENT (OUTPATIENT)
Dept: ENDOSCOPY | Facility: HOSPITAL | Age: 63
End: 2023-11-15
Payer: MEDICARE

## 2023-11-15 ENCOUNTER — ANESTHESIA (OUTPATIENT)
Dept: ENDOSCOPY | Facility: HOSPITAL | Age: 63
End: 2023-11-15
Payer: MEDICARE

## 2023-11-15 ENCOUNTER — HOSPITAL ENCOUNTER (OUTPATIENT)
Facility: HOSPITAL | Age: 63
Discharge: HOME | End: 2023-11-15
Attending: INTERNAL MEDICINE | Admitting: INTERNAL MEDICINE
Payer: MEDICARE

## 2023-11-15 VITALS
OXYGEN SATURATION: 96 % | DIASTOLIC BLOOD PRESSURE: 73 MMHG | HEART RATE: 73 BPM | TEMPERATURE: 99 F | BODY MASS INDEX: 48.53 KG/M2 | RESPIRATION RATE: 16 BRPM | WEIGHT: 302 LBS | SYSTOLIC BLOOD PRESSURE: 125 MMHG | HEIGHT: 66 IN

## 2023-11-15 DIAGNOSIS — K63.5 POLYP OF COLON, UNSPECIFIED PART OF COLON, UNSPECIFIED TYPE: Primary | ICD-10-CM

## 2023-11-15 DIAGNOSIS — K63.5 COLON POLYP: ICD-10-CM

## 2023-11-15 PROCEDURE — 45390 COLONOSCOPY W/RESECTION: CPT | Mod: 59,,, | Performed by: INTERNAL MEDICINE

## 2023-11-15 PROCEDURE — 27200997: Performed by: INTERNAL MEDICINE

## 2023-11-15 PROCEDURE — 45385 COLONOSCOPY W/LESION REMOVAL: CPT | Performed by: INTERNAL MEDICINE

## 2023-11-15 PROCEDURE — 27201089 HC SNARE, DISP (ANY): Performed by: INTERNAL MEDICINE

## 2023-11-15 PROCEDURE — 37000009 HC ANESTHESIA EA ADD 15 MINS: Performed by: INTERNAL MEDICINE

## 2023-11-15 PROCEDURE — 37000008 HC ANESTHESIA 1ST 15 MINUTES: Performed by: INTERNAL MEDICINE

## 2023-11-15 PROCEDURE — 27202363 HC INJECTION AGENT, SUBMUCOSAL, ANY: Performed by: INTERNAL MEDICINE

## 2023-11-15 PROCEDURE — D9220A PRA ANESTHESIA: ICD-10-PCS | Mod: ANES,,, | Performed by: ANESTHESIOLOGY

## 2023-11-15 PROCEDURE — 63600175 PHARM REV CODE 636 W HCPCS: Performed by: NURSE ANESTHETIST, CERTIFIED REGISTERED

## 2023-11-15 PROCEDURE — D9220A PRA ANESTHESIA: ICD-10-PCS | Mod: CRNA,,, | Performed by: NURSE ANESTHETIST, CERTIFIED REGISTERED

## 2023-11-15 PROCEDURE — 45385 PR COLONOSCOPY,REMV LESN,SNARE: ICD-10-PCS | Mod: ,,, | Performed by: INTERNAL MEDICINE

## 2023-11-15 PROCEDURE — 45390 COLONOSCOPY W/RESECTION: CPT | Mod: 59 | Performed by: INTERNAL MEDICINE

## 2023-11-15 PROCEDURE — 88309 PR  SURG PATH,LEVEL VI: ICD-10-PCS | Mod: 26,,, | Performed by: PATHOLOGY

## 2023-11-15 PROCEDURE — 45390: ICD-10-PCS | Mod: 59,,, | Performed by: INTERNAL MEDICINE

## 2023-11-15 PROCEDURE — 27201028 HC NEEDLE, SCLERO: Performed by: INTERNAL MEDICINE

## 2023-11-15 PROCEDURE — D9220A PRA ANESTHESIA: Mod: ANES,,, | Performed by: ANESTHESIOLOGY

## 2023-11-15 PROCEDURE — 88309 TISSUE EXAM BY PATHOLOGIST: CPT | Mod: 26,,, | Performed by: PATHOLOGY

## 2023-11-15 PROCEDURE — D9220A PRA ANESTHESIA: Mod: CRNA,,, | Performed by: NURSE ANESTHETIST, CERTIFIED REGISTERED

## 2023-11-15 PROCEDURE — 45385 COLONOSCOPY W/LESION REMOVAL: CPT | Mod: ,,, | Performed by: INTERNAL MEDICINE

## 2023-11-15 PROCEDURE — 25000003 PHARM REV CODE 250: Performed by: NURSE ANESTHETIST, CERTIFIED REGISTERED

## 2023-11-15 PROCEDURE — 88309 TISSUE EXAM BY PATHOLOGIST: CPT | Performed by: PATHOLOGY

## 2023-11-15 PROCEDURE — 25000003 PHARM REV CODE 250: Performed by: INTERNAL MEDICINE

## 2023-11-15 RX ORDER — SODIUM CHLORIDE 0.9 % (FLUSH) 0.9 %
10 SYRINGE (ML) INJECTION
Status: DISCONTINUED | OUTPATIENT
Start: 2023-11-15 | End: 2023-11-15 | Stop reason: HOSPADM

## 2023-11-15 RX ORDER — LIDOCAINE HYDROCHLORIDE 20 MG/ML
INJECTION INTRAVENOUS
Status: DISCONTINUED | OUTPATIENT
Start: 2023-11-15 | End: 2023-11-15

## 2023-11-15 RX ORDER — FENTANYL CITRATE 50 UG/ML
25 INJECTION, SOLUTION INTRAMUSCULAR; INTRAVENOUS EVERY 5 MIN PRN
Status: DISCONTINUED | OUTPATIENT
Start: 2023-11-15 | End: 2023-11-15 | Stop reason: HOSPADM

## 2023-11-15 RX ORDER — PROPOFOL 10 MG/ML
VIAL (ML) INTRAVENOUS CONTINUOUS PRN
Status: DISCONTINUED | OUTPATIENT
Start: 2023-11-15 | End: 2023-11-15

## 2023-11-15 RX ORDER — ONDANSETRON 2 MG/ML
4 INJECTION INTRAMUSCULAR; INTRAVENOUS DAILY PRN
Status: DISCONTINUED | OUTPATIENT
Start: 2023-11-15 | End: 2023-11-15 | Stop reason: HOSPADM

## 2023-11-15 RX ORDER — PROPOFOL 10 MG/ML
VIAL (ML) INTRAVENOUS
Status: DISCONTINUED | OUTPATIENT
Start: 2023-11-15 | End: 2023-11-15

## 2023-11-15 RX ORDER — SODIUM CHLORIDE 9 MG/ML
INJECTION, SOLUTION INTRAVENOUS CONTINUOUS
Status: DISCONTINUED | OUTPATIENT
Start: 2023-11-15 | End: 2023-11-15 | Stop reason: HOSPADM

## 2023-11-15 RX ADMIN — LIDOCAINE HYDROCHLORIDE 50 MG: 20 INJECTION INTRAVENOUS at 12:11

## 2023-11-15 RX ADMIN — Medication 50 MG: at 12:11

## 2023-11-15 RX ADMIN — SODIUM CHLORIDE: 0.9 INJECTION, SOLUTION INTRAVENOUS at 12:11

## 2023-11-15 RX ADMIN — Medication 30 MG: at 12:11

## 2023-11-15 RX ADMIN — PROPOFOL 125 MCG/KG/MIN: 10 INJECTION, EMULSION INTRAVENOUS at 12:11

## 2023-11-15 NOTE — PROVATION PATIENT INSTRUCTIONS
Discharge Summary/Instructions after an Endoscopic Procedure  Patient Name: Bobby Tavarez  Patient MRN: [id number]  Patient YOB: 1960  Wednesday, November 15, 2023  Bj Saleh MD  Dear patient,  As a result of recent federal legislation (The Federal Cures Act), you may   receive lab or pathology results from your procedure in your [facility]   account before your physician is able to contact you. Your physician or   their representative will relay the results to you with their   recommendations at their soonest availability.  Thank you,  RESTRICTIONS:  During your procedure today, you received medications for sedation.  These   medications may affect your judgment, balance and coordination.  Therefore,   for 24 hours, you have the following restrictions:   - DO NOT drive a car, operate machinery, make legal/financial decisions,   sign important papers or drink alcohol.    ACTIVITY:  Today: no heavy lifting, straining or running due to procedural   sedation/anesthesia.  The following day: return to full activity including work.  DIET:  Eat and drink normally unless instructed otherwise.     TREATMENT FOR COMMON SIDE EFFECTS:  - Mild abdominal pain, nausea, belching, bloating or excessive gas:  rest,   eat lightly and use a heating pad.  - Sore Throat: treat with throat lozenges and/or gargle with warm salt   water.  - Because air was used during the procedure, expelling large amounts of air   from your rectum or belching is normal.  - If a bowel prep was taken, you may not have a bowel movement for 1-3 days.    This is normal.  SYMPTOMS TO WATCH FOR AND REPORT TO YOUR PHYSICIAN:  1. Abdominal pain or bloating, other than gas cramps.  2. Chest pain.  3. Back pain.  4. Signs of infection such as: chills or fever occurring within 24 hours   after the procedure.  5. Rectal bleeding, which would show as bright red, maroon, or black stools.   (A tablespoon of blood from the rectum is not serious, especially  if   hemorrhoids are present.)  6. Vomiting.  7. Weakness or dizziness.  GO DIRECTLY TO THE NEAREST EMERGENCY ROOM IF YOU HAVE ANY OF THE FOLLOWING:      Difficulty breathing              Chills and/or fever over 101 F   Persistent vomiting and/or vomiting blood   Severe abdominal pain   Severe chest pain   Black, tarry stools   Bleeding- more than one tablespoon   Any other symptom or condition that you feel may need urgent attention  Your doctor recommends these additional instructions:  If any biopsies were taken, your doctors clinic will contact you in 1 to 2   weeks with any results.  - Repeat colonoscopy in 6 months for surveillance.   - Discharge patient to home.   - Resume previous diet.   - Continue present medications.  For questions, problems or results please call your physician - Bj Saleh MD at Work:  [phone].  [facility], EMERGENCY ROOM PHONE NUMBER: [phone]  IF A COMPLICATION OR EMERGENCY SITUATION ARISES AND YOU ARE UNABLE TO REACH   YOUR PHYSICIAN - GO DIRECTLY TO THE EMERGENCY ROOM.  Bj Saleh MD  11/15/2023 1:18:25 PM  This report has been verified and signed electronically.  Dear patient,  As a result of recent federal legislation (The Federal Cures Act), you may   receive lab or pathology results from your procedure in your [facility]   account before your physician is able to contact you. Your physician or   their representative will relay the results to you with their   recommendations at their soonest availability.  Thank you,  PROVATION

## 2023-11-15 NOTE — ANESTHESIA POSTPROCEDURE EVALUATION
Anesthesia Post Evaluation    Patient: Bobby Tavarez    Procedure(s) Performed: Procedure(s) (LRB):  COLONOSCOPY (N/A)    Final Anesthesia Type: general      Patient location during evaluation: PACU  Patient participation: Yes- Able to Participate  Level of consciousness: awake and alert  Post-procedure vital signs: reviewed and stable  Pain management: adequate  Airway patency: patent    PONV status at discharge: No PONV  Anesthetic complications: no      Cardiovascular status: hemodynamically stable  Respiratory status: spontaneous ventilation  Follow-up not needed.          Vitals Value Taken Time   /73 11/15/23 1347   Temp 37.1 °C (98.8 °F) 11/15/23 1315   Pulse 75 11/15/23 1353   Resp 20 11/15/23 1322   SpO2 97 % 11/15/23 1353   Vitals shown include unvalidated device data.      No case tracking events are documented in the log.      Pain/Aleena Score: Aleena Score: 10 (11/15/2023  1:30 PM)

## 2023-11-15 NOTE — TRANSFER OF CARE
"Anesthesia Transfer of Care Note    Patient: Bobby Tavarez    Procedure(s) Performed: Procedure(s) (LRB):  COLONOSCOPY (N/A)    Patient location: [facility]    Anesthesia Type: general    Transport from OR: Transported from OR on room air with adequate spontaneous ventilation    Post pain: adequate analgesia    Post assessment: no apparent anesthetic complications and tolerated procedure well    Post vital signs: stable    Level of consciousness: awake, alert and oriented    Nausea/Vomiting: no nausea/vomiting    Complications: none    Transfer of care protocol was followed    Last vitals: Visit Vitals  /69   Pulse 94   Temp 36.8 °C (98.2 °F) (Temporal)   Resp 16   Ht 5' 6" (1.676 m)   Wt (!) 137 kg (302 lb)   SpO2 (!) 94%   BMI 48.74 kg/m²     "

## 2023-11-15 NOTE — PLAN OF CARE
Patient discharged to home via wheelchair, escorted [facility] transport. Pt alert and talkative, vitals stable on room air. Discharge instructions (written and verbal) and follow-up information given to patient who verbalized understanding, as well as a readiness for discharge. [facility] contact info provided for additional questions following discharge. SREE

## 2023-11-15 NOTE — ANESTHESIA PREPROCEDURE EVALUATION
"                                                                                                             11/15/2023  Bobby Tavarez is a 63 y.o., male.    Pre-operative evaluation for Procedure(s) (LRB):  COLONOSCOPY (N/A)    Patient Active Problem List   Diagnosis    Primary hypertension    Osteoarthritis of both knees    Class 3 severe obesity in adult    Hyperlipidemia    Prediabetes    Colon polyp            No medications prior to admission.       Review of patient's allergies indicates:  No Known Allergies    Past Medical History:   Diagnosis Date    Essential (primary) hypertension     Hyperlipidemia     Obesity, morbid, BMI 50 or higher     Osteoarthritis of both knees     Prediabetes      Past Surgical History:   Procedure Laterality Date    COLONOSCOPY N/A 10/6/2023    Procedure: COLONOSCOPY;  Surgeon: Edwina Garcia MD;  [facility] ([address] [place] [address]);  Service: Endoscopy;  Laterality: N/A;  10/2 BMI: 48.68  Not taking Ozempic  Referral:  Jame Jean MD  Extended PEG prep  Inst emailed to [email or web address]  LW  10/4-precall complete-[facility]  10/5-pt notified of floor change-[facility]     Tobacco Use    Smoking status: Never    Smokeless tobacco: Never   Substance and Sexual Activity    Alcohol use: Never    Drug use: Never    Sexual activity: Not on file       Objective:     Vital Signs (Most Recent):    Vital Signs (24h Range):           There is no height or weight on file to calculate BMI.        Significant Labs:  All pertinent labs from the last 24 hours have been reviewed.    CBC: No results for input(s): "WBC", "RBC", "HGB", "HCT", "PLT", "MCV", "MCH", "MCHC" in the last 72 hours.    CMP: No results for input(s): "NA", "K", "CL", "CO2", "BUN", "CREATININE", "GLU", "MG", "PHOS", "CALCIUM", "ALBUMIN", "PROT", "ALKPHOS", "ALT", "AST", "BILITOT" in the last 72 hours.    INR  No results for input(s): "PT", "INR", "PROTIME", "APTT" in the last 72 hours.      Pre-op Assessment    I have reviewed the " Patient Summary Reports.     I have reviewed the Nursing Notes. I have reviewed the NPO Status.   I have reviewed the Medications.     Review of Systems  Anesthesia Hx:             Denies Family Hx of Anesthesia complications.    Denies Personal Hx of Anesthesia complications.                    Cardiovascular:     Hypertension                                  Hypertension         Musculoskeletal:  Arthritis        Arthritis          Neurological:      Arthritis                               Physical Exam  General: Well nourished    Airway:  Mallampati: II   Mouth Opening: Normal  TM Distance: Normal  Tongue: Normal  Neck ROM: Normal ROM    Dental:  Any loose and/or missing teeth verified with patient   Chest/Lungs:  Clear to auscultation    Heart:  Rate: Normal  Rhythm: Regular Rhythm  Sounds: Normal    Abdomen:  Normal        Anesthesia Plan  Type of Anesthesia, risks & benefits discussed:    Anesthesia Type: Gen ETT, Gen Supraglottic Airway, Gen Natural Airway, MAC  Intra-op Monitoring Plan: Standard ASA Monitors  Post Op Pain Control Plan: multimodal analgesia and IV/PO Opioids PRN  Induction:  IV  Informed Consent: Informed consent signed with the Patient and all parties understand the risks and agree with anesthesia plan.  All questions answered.   ASA Score: 3    Ready For Surgery From Anesthesia Perspective.     .

## 2023-11-15 NOTE — H&P
Short Stay Endoscopy History and Physical    PCP - Jame Jean MD  Referring Physician - Edwina Garcia MD  [address] [place] [address]    Procedure - colonoscopy w EMR    ASA - per anesthesia  Mallampati - per anesthesia  History of Anesthesia problems - no  Family history Anesthesia problems -  no   Plan of anesthesia - General    HPI:  This is a 63 y.o. male here for evaluation of: colon polyp    Reflux - no  Dysphagia - no  Abdominal pain - no  Diarrhea - no    ROS:  Constitutional: No fevers, chills, No weight loss  CV: No chest pain  Pulm: No cough, No shortness of breath  Ophtho: No vision changes  GI: see HPI  Derm: No rash    Medical History:  has a past medical history of Essential (primary) hypertension, Hyperlipidemia, Obesity, morbid, BMI 50 or higher, Osteoarthritis of both knees, and Prediabetes.    Surgical History:  has a past surgical history that includes Colonoscopy (N/A, 10/6/2023).    Family History: family history includes Diabetes in his mother..    Social History:  reports that he has never smoked. He has never used smokeless tobacco. He reports that he does not drink alcohol and does not use drugs.    Review of patient's allergies indicates:  No Known Allergies    Medications:   Medications Prior to Admission   Medication Sig Dispense Refill Last Dose    amLODIPine (NORVASC) 10 MG tablet Take 1 tablet (10 mg total) by mouth once daily. 90 tablet 3 11/15/2023    atorvastatin (LIPITOR) 40 MG tablet Take 1 tablet (40 mg total) by mouth once daily. 90 tablet 1 11/14/2023    meloxicam (MOBIC) 7.5 MG tablet Take 1 tablet (7.5 mg total) by mouth once daily. 90 tablet 3 11/14/2023    cholecalciferol, vitamin D3, (VITAMIN D3) 50 mcg (2,000 unit) Cap capsule Vitamin D3 50 mcg (2,000 unit) capsule   Take 1 capsule every day by oral route.       LIDOcaine (LIDODERM) 5 % Place 1 patch onto the skin once daily. Remove & Discard patch within 12 hours or as directed by MD 30 patch 0      metFORMIN (GLUCOPHAGE-XR) 500 MG ER 24hr tablet Take 1 tablet (500 mg total) by mouth daily with breakfast. 90 tablet 1     semaglutide (OZEMPIC) 0.25 mg or 0.5 mg (2 mg/3 mL) pen injector Inject 0.25 mg into the skin every 7 days. (Patient not taking: Reported on 10/20/2023) 3 mL 0     SENNA 8.6 mg tablet Take 1 tablet by mouth once daily. 90 tablet 1        Physical Exam:    Vital Signs:   Vitals:    11/15/23 1151   BP: 105/64   Pulse: 99   Resp: 18   Temp: 98.2 °F (36.8 °C)       General Appearance: Well appearing in no acute distress    Labs:  Lab Results   Component Value Date    WBC 6.31 05/12/2023    HGB 14.3 05/12/2023    HCT 43.5 05/12/2023     05/12/2023    CHOL 193 05/12/2023    TRIG 59 05/12/2023    HDL 41 05/12/2023    ALT 24 05/12/2023    AST 22 05/12/2023     05/12/2023    K 3.7 05/12/2023     05/12/2023    CREATININE 1.1 05/12/2023    BUN 9 05/12/2023    CO2 24 05/12/2023    TSH 1.459 05/12/2023    PSA 0.36 05/12/2023    HGBA1C 5.7 (H) 05/12/2023       I have explained the risks and benefits of this endoscopic procedure to the patient including but not limited to bleeding, inflammation, infection, perforation, and death.      Bj Saleh MD

## 2023-11-17 LAB
FINAL PATHOLOGIC DIAGNOSIS: NORMAL
GROSS: NORMAL
Lab: NORMAL

## 2024-02-21 NOTE — PROGRESS NOTES
[facility]   Clinic Note    Patient Name: Bobby Tavarez   YOB: 1960     SUBJECTIVE:     Chief Complaint: Follow-up for weight loss and Right knee issues.    History of Present Illness:  Mr. Bobby Tavarez is a 64 y.o. male with history of HLD, HTN, OA of bilateral knees and prediabetes who presents today for 5 month follow-up. Patient is doing generally well, enjoying group home and keeping busy with his adult children. His chronic issues are as follows:     HTN: Well controlled and uses a home BP cuff. Wife reports that SBP is typically in the 110s-120s. On Amlodipine 10mg.     HLD: Last lipids showed total chol 193 and . On atorvastatin 40mg.     OA: Bilateral knee OA, referred to orthopedics previously. Saw ortho and received CSI in both knees, pt reports significant improvement in symptoms and with good response to steroid injection. On Mobic 7.5 and reports that the pain is well controlled. He is also endorsing some pain in his lower back and the back of his legs, bilaterally. Today he reports that last week, he had an episode where he felt his Right knee give out under him. No associated neurological deficits such as weakness or sensory changes. He was standing at the time it occurred; did not result in a full fall or hitting his head, however did lose his balance. He reports that he feels more unsteady now as a result.     Prediabetes and Obesity, BMI 49: Patient today and wife expressing the feeling that lifestyle management for weight loss has not been successful. Patient still eating rather unhealthy and unable to limit his snacking. On a previous visit, they expressed interest in weight loss medication with injectables, but given his A1c of 5.7, he was unable to be covered for something like Ozempic. His weight has largely stable.     Review of Systems   Constitutional:  Negative for chills, fever and weight loss.   HENT:  Negative for congestion and sore  throat.    Eyes:  Negative for double vision and photophobia.   Respiratory:  Negative for cough and shortness of breath.    Cardiovascular:  Negative for chest pain and palpitations.   Gastrointestinal:  Negative for abdominal pain and vomiting.   Genitourinary:  Negative for dysuria and urgency.   Musculoskeletal:  Positive for falls and joint pain. Negative for myalgias and neck pain.   Neurological:  Negative for dizziness and weakness.   Psychiatric/Behavioral:  Negative for depression. The patient is not nervous/anxious.        PAST HISTORY:     Past Medical History:   Diagnosis Date    Essential (primary) hypertension     Hyperlipidemia     Obesity, morbid, BMI 50 or higher     Osteoarthritis of both knees     Prediabetes        Past Surgical History:   Procedure Laterality Date    COLONOSCOPY N/A 10/6/2023    Procedure: COLONOSCOPY;  Surgeon: Edwina Garcia MD;  Location: [facility] ([address] [place] [address]);  Service: Endoscopy;  Laterality: N/A;  10/2 BMI: 48.68  Not taking Ozempic  Referral:  Jame Jean MD  Extended PEG prep  Inst emailed to [email or web address]    10/4-precall complete-Kpvt  10/5-pt notified of floor change-Kpvt    COLONOSCOPY N/A 11/15/2023    Procedure: COLONOSCOPY;  Surgeon: Bj Saleh MD;  Location: [facility] ([address] [place] [address]);  Service: Endoscopy;  Laterality: N/A;  10/20/23-PEG extended prep, instructions via portal, pt is not taking Ozempic-DS  11/9-precall complete-MS       Family History   Problem Relation Age of Onset    Diabetes Mother        Social History     Socioeconomic History    Marital status:    Occupational History    Occupation: , retired   Tobacco Use    Smoking status: Never    Smokeless tobacco: Never   Substance and Sexual Activity    Alcohol use: Never    Drug use: Never       MEDICATIONS & ALLERGIES:     Current Outpatient Medications on File Prior to Visit   Medication Sig    amLODIPine (NORVASC) 10 MG tablet Take 1 tablet (10 mg total)  by mouth once daily.    atorvastatin (LIPITOR) 40 MG tablet Take 1 tablet (40 mg total) by mouth once daily.    cholecalciferol, vitamin D3, (VITAMIN D3) 50 mcg (2,000 unit) Cap capsule Vitamin D3 50 mcg (2,000 unit) capsule   Take 1 capsule every day by oral route.    LIDOcaine (LIDODERM) 5 % Place 1 patch onto the skin once daily. Remove & Discard patch within 12 hours or as directed by MD    meloxicam (MOBIC) 7.5 MG tablet Take 1 tablet (7.5 mg total) by mouth once daily.    metFORMIN (GLUCOPHAGE-XR) 500 MG ER 24hr tablet Take 1 tablet (500 mg total) by mouth daily with breakfast.    semaglutide (OZEMPIC) 0.25 mg or 0.5 mg (2 mg/3 mL) pen injector Inject 0.25 mg into the skin every 7 days. (Patient not taking: Reported on 10/20/2023)    SENNA 8.6 mg tablet Take 1 tablet by mouth once daily.     No current facility-administered medications on file prior to visit.       Review of patient's allergies indicates:  No Known Allergies    OBJECTIVE:     Vital Signs:  There were no vitals filed for this visit.    There is no height or weight on file to calculate BMI.     Physical Exam  Vitals reviewed.   Constitutional:       Appearance: Normal appearance. He is obese.   HENT:      Head: Normocephalic and atraumatic.      Nose: No congestion or rhinorrhea.   Eyes:      Extraocular Movements: Extraocular movements intact.      Pupils: Pupils are equal, round, and reactive to light.   Cardiovascular:      Rate and Rhythm: Normal rate and regular rhythm.      Pulses: Normal pulses.      Heart sounds: Normal heart sounds.   Pulmonary:      Effort: Pulmonary effort is normal. No respiratory distress.      Breath sounds: Normal breath sounds.   Abdominal:      General: Abdomen is flat.      Palpations: Abdomen is soft.      Tenderness: There is no abdominal tenderness.   Musculoskeletal:         General: No swelling, tenderness or deformity. Normal range of motion.      Comments: Crepitus felt in both knees  Minimal tenderness  "to palpation   Skin:     General: Skin is warm.      Findings: No rash.   Neurological:      Mental Status: He is alert and oriented to person, place, and time. Mental status is at baseline.      Motor: No weakness.      Comments: Full strength and sensation   Psychiatric:         Mood and Affect: Mood normal.         Behavior: Behavior normal.         Laboratory  Lab Results   Component Value Date    WBC 6.31 05/12/2023    HGB 14.3 05/12/2023    HCT 43.5 05/12/2023    MCV 83 05/12/2023     05/12/2023     BMP  Lab Results   Component Value Date     05/12/2023    K 3.7 05/12/2023     05/12/2023    CO2 24 05/12/2023    BUN 9 05/12/2023    CREATININE 1.1 05/12/2023    CALCIUM 9.2 05/12/2023    ANIONGAP 11 05/12/2023    EGFRNORACEVR >60.0 05/12/2023     No results found for: "INR", "PROTIME"  Lab Results   Component Value Date    HGBA1C 5.7 (H) 05/12/2023         Health Maintenance Due   Topic Date Due    RSV Vaccine (Age 60+ and Pregnant patients) (1 - 1-dose 60+ series) Never done    Influenza Vaccine (1) Never done    Colorectal Cancer Screening  05/15/2024          HEALTH MAINTENANCE:   Cancer Screening:  Colonoscopy:       ASSESSMENT & PLAN:   Mr. Bobby Tavarez is a 64 y.o. male who presents for follow-up.  -     Still no meaningful weight loss after 5 months despite counseling on making significant lifestyle changes. Pt still engaging in unhealthy eating habits such as frequent snacking of sweets. Physical activity limited as well due to his knee OA and recent feelings of unsteadiness after he felt his knee give out under him.  - Physical exam is unconcerning for any significant injury to the right knee joint to have caused his sensation of buckling. No signs of ligament or tendon tears that I can appreciate. No significant pain asides from his usual OA pain. I suspect this is due primarily to chronic OA in the setting of morbid obesity and that he would benefit from strengthening of his knee " muscles to promote joint stability. I will refer him to outpatient PT. I counseled the patient that weight loss would immensely benefit him with regards to his knee issues.  - Regarding weight loss, he has not shown ability to lose weight despite lifestyle management. At this stage, I would recommend that he be seen by our Bariatric Medicine clinic; injectables were again brought up, and I suggested that they could potentially reach out to a private clinic, [facility], to see if they might be able to receive a prescription for Ozempic at cheaper cost than otherwise.  - 3 mo f/u for annual exam and labs.    BMI 45.0-49.9, adult  -     Ambulatory referral/consult to Bariatric/Obesity Medicine; Future; Expected date: 02/29/2024    Osteoarthritis of both knees, unspecified osteoarthritis type  -     Ambulatory referral/consult to Physical/Occupational Therapy; Future; Expected date: 02/29/2024      Return to clinic in 3 months for annual and labs, or sooner as needed.    Patient was discussed with Dr. Khan.    Jame Jean MD  Internal Medicine, PGY-2  [facility]

## 2024-02-22 ENCOUNTER — OFFICE VISIT (OUTPATIENT)
Dept: INTERNAL MEDICINE | Facility: CLINIC | Age: 64
End: 2024-02-22
Payer: MEDICARE

## 2024-02-22 VITALS
SYSTOLIC BLOOD PRESSURE: 131 MMHG | OXYGEN SATURATION: 96 % | WEIGHT: 303.81 LBS | DIASTOLIC BLOOD PRESSURE: 91 MMHG | HEIGHT: 66 IN | BODY MASS INDEX: 48.83 KG/M2 | HEART RATE: 89 BPM

## 2024-02-22 DIAGNOSIS — M17.0 OSTEOARTHRITIS OF BOTH KNEES, UNSPECIFIED OSTEOARTHRITIS TYPE: ICD-10-CM

## 2024-02-22 PROCEDURE — 99999 PR PBB SHADOW E&M-EST. PATIENT-LVL IV: CPT | Mod: PBBFAC,GC,,

## 2024-02-22 PROCEDURE — 99213 OFFICE O/P EST LOW 20 MIN: CPT | Mod: GC,S$GLB,,

## 2024-02-22 PROCEDURE — 3008F BODY MASS INDEX DOCD: CPT | Mod: CPTII,GC,S$GLB,

## 2024-02-22 PROCEDURE — 3080F DIAST BP >= 90 MM HG: CPT | Mod: CPTII,GC,S$GLB,

## 2024-02-22 PROCEDURE — 1159F MED LIST DOCD IN RCRD: CPT | Mod: CPTII,GC,S$GLB,

## 2024-02-22 PROCEDURE — 3075F SYST BP GE 130 - 139MM HG: CPT | Mod: CPTII,GC,S$GLB,

## 2024-02-22 PROCEDURE — 3288F FALL RISK ASSESSMENT DOCD: CPT | Mod: CPTII,GC,S$GLB,

## 2024-02-22 PROCEDURE — 1100F PTFALLS ASSESS-DOCD GE2>/YR: CPT | Mod: CPTII,GC,S$GLB,

## 2024-02-22 NOTE — PATIENT INSTRUCTIONS
I ordered a referral to PT and Bariatric Medicine as I believe your knee issues would benefit from greater weight loss. In the meantime, I also would suggest calling the immetry clinic in [place] at [phone] to see if you can set up an appointment for a consultation. Follow up in 3 months for annual.

## 2024-02-26 NOTE — PROGRESS NOTES
I have personally discussed  this patient and agree with the resident, and agree with  their note as stated above with the following thoughts:  We are going to have him see orthopedics and I think it is reasonable to have him see bariatric with a BMI 49.03.

## 2024-02-27 DIAGNOSIS — Z00.00 ENCOUNTER FOR MEDICARE ANNUAL WELLNESS EXAM: ICD-10-CM

## 2024-03-12 DIAGNOSIS — R73.03 PREDIABETES: ICD-10-CM

## 2024-03-13 RX ORDER — METFORMIN HYDROCHLORIDE 500 MG/1
500 TABLET, EXTENDED RELEASE ORAL
Qty: 90 TABLET | Refills: 1 | Status: SHIPPED | OUTPATIENT
Start: 2024-03-13 | End: 2024-09-09

## 2024-03-17 ENCOUNTER — HOSPITAL ENCOUNTER (EMERGENCY)
Facility: HOSPITAL | Age: 64
Discharge: HOME | End: 2024-03-17
Attending: EMERGENCY MEDICINE
Payer: MEDICARE

## 2024-03-17 VITALS
WEIGHT: 294 LBS | RESPIRATION RATE: 22 BRPM | HEART RATE: 95 BPM | BODY MASS INDEX: 47.45 KG/M2 | OXYGEN SATURATION: 95 % | DIASTOLIC BLOOD PRESSURE: 77 MMHG | SYSTOLIC BLOOD PRESSURE: 111 MMHG | TEMPERATURE: 98 F

## 2024-03-17 DIAGNOSIS — T78.40XA ALLERGIC REACTION, INITIAL ENCOUNTER: Primary | ICD-10-CM

## 2024-03-17 DIAGNOSIS — H57.89 EYE SWELLING, BILATERAL: ICD-10-CM

## 2024-03-17 PROCEDURE — 25000003 PHARM REV CODE 250: Mod: ER

## 2024-03-17 PROCEDURE — 99284 EMERGENCY DEPT VISIT MOD MDM: CPT | Mod: 25,ER

## 2024-03-17 PROCEDURE — 63600175 PHARM REV CODE 636 W HCPCS: Mod: ER

## 2024-03-17 PROCEDURE — 96372 THER/PROPH/DIAG INJ SC/IM: CPT

## 2024-03-17 RX ORDER — FAMOTIDINE 20 MG/1
20 TABLET, FILM COATED ORAL
Status: COMPLETED | OUTPATIENT
Start: 2024-03-17 | End: 2024-03-17

## 2024-03-17 RX ORDER — EPINEPHRINE 0.3 MG/.3ML
1 INJECTION SUBCUTANEOUS ONCE
Qty: 0.3 ML | Refills: 0 | Status: SHIPPED | OUTPATIENT
Start: 2024-03-17 | End: 2024-03-17

## 2024-03-17 RX ORDER — DIPHENHYDRAMINE HCL 50 MG
50 CAPSULE ORAL
Status: COMPLETED | OUTPATIENT
Start: 2024-03-17 | End: 2024-03-17

## 2024-03-17 RX ORDER — METHYLPREDNISOLONE 4 MG/1
TABLET ORAL
Qty: 21 EACH | Refills: 0 | Status: SHIPPED | OUTPATIENT
Start: 2024-03-17 | End: 2024-04-07

## 2024-03-17 RX ORDER — DEXAMETHASONE SODIUM PHOSPHATE 4 MG/ML
10 INJECTION, SOLUTION INTRA-ARTICULAR; INTRALESIONAL; INTRAMUSCULAR; INTRAVENOUS; SOFT TISSUE
Status: COMPLETED | OUTPATIENT
Start: 2024-03-17 | End: 2024-03-17

## 2024-03-17 RX ORDER — FAMOTIDINE 20 MG/1
20 TABLET, FILM COATED ORAL 2 TIMES DAILY
Qty: 20 TABLET | Refills: 0 | Status: SHIPPED | OUTPATIENT
Start: 2024-03-17 | End: 2024-03-27

## 2024-03-17 RX ORDER — CETIRIZINE HYDROCHLORIDE 10 MG/1
10 TABLET ORAL DAILY
Qty: 30 TABLET | Refills: 0 | Status: SHIPPED | OUTPATIENT
Start: 2024-03-17 | End: 2024-04-16

## 2024-03-17 RX ADMIN — DEXAMETHASONE SODIUM PHOSPHATE 10 MG: 4 INJECTION, SOLUTION INTRAMUSCULAR; INTRAVENOUS at 07:03

## 2024-03-17 RX ADMIN — DIPHENHYDRAMINE HYDROCHLORIDE 50 MG: 50 CAPSULE ORAL at 07:03

## 2024-03-17 RX ADMIN — FAMOTIDINE 20 MG: 20 TABLET, FILM COATED ORAL at 07:03

## 2024-03-18 ENCOUNTER — TELEPHONE (OUTPATIENT)
Dept: ENDOSCOPY | Facility: HOSPITAL | Age: 64
End: 2024-03-18
Payer: MEDICARE

## 2024-03-18 NOTE — TELEPHONE ENCOUNTER
Telephoned pt to schedule colonoscopy with no answer.  Voicemail message left with direct contact number for pt to return call.

## 2024-03-18 NOTE — ED NOTES
Pt states that he attempted to take benadryl with no relief. He also endorses itching around the eye

## 2024-03-18 NOTE — ED PROVIDER NOTES
Encounter Date: 3/17/2024       History     Chief Complaint   Patient presents with    Facial Swelling     Pt c/o swelling to his face since Friday night and worse today.      Bobby Tavarez is a 64 y.o. male  has a past medical history of Essential (primary) hypertension, Hyperlipidemia, Obesity, morbid, BMI 50 or higher, Osteoarthritis of both knees, and Prediabetes. presenting to the Emergency Department for Facial swelling since yesterday.  Patient states that he ate some fried fish and peanuts Friday night and he is uncertain that caused his symptoms.  Reports swelling of his eyes and a rash around his forehead and his eyes.  Symptoms have progressed since yesterday.  No new medications.  No change in current medications.  No ACE or ARB use.  No fevers.  No vision changes.  No trismus, drooling, difficulty managing secretions.  No shortness of breath or difficulty swallowing.  No chest pain.  No nausea, vomiting, abdominal pain.          The history is provided by the patient.     Review of patient's allergies indicates:  No Known Allergies  Past Medical History:   Diagnosis Date    Essential (primary) hypertension     Hyperlipidemia     Obesity, morbid, BMI 50 or higher     Osteoarthritis of both knees     Prediabetes      Past Surgical History:   Procedure Laterality Date    COLONOSCOPY N/A 10/6/2023    Procedure: COLONOSCOPY;  Surgeon: Edwina Garcia MD;  Location: [facility] ([address] [place] [address]);  Service: Endoscopy;  Laterality: N/A;  10/2 BMI: 48.68  Not taking Ozempic  Referral:  Jame Jean MD  Extended PEG prep  Inst emailed to [email or web address]  LW  10/4-precall complete-Kpvt  10/5-pt notified of floor change-Kpvt    COLONOSCOPY N/A 11/15/2023    Procedure: COLONOSCOPY;  Surgeon: Bj Saleh MD;  Location: [facility] ([address] [place] [address]);  Service: Endoscopy;  Laterality: N/A;  10/20/23-PEG extended prep, instructions via portal, pt is not taking Ozempic-DS  11/9-precall complete-MS     Family History    Problem Relation Age of Onset    Diabetes Mother      Social History     Tobacco Use    Smoking status: Never    Smokeless tobacco: Never   Substance Use Topics    Alcohol use: Never    Drug use: Never     Review of Systems   Constitutional:  Negative for fever.   HENT:  Positive for facial swelling. Negative for sore throat.    Respiratory:  Negative for shortness of breath.    Cardiovascular:  Negative for chest pain.   Gastrointestinal:  Negative for nausea.   Genitourinary:  Negative for dysuria.   Musculoskeletal:  Negative for back pain.   Skin:  Negative for rash.   Neurological:  Negative for weakness.   Hematological:  Does not bruise/bleed easily.   All other systems reviewed and are negative.      Physical Exam     Initial Vitals [03/17/24 1855]   BP Pulse Resp Temp SpO2   111/77 95 (!) 22 98.4 °F (36.9 °C) 95 %      MAP       --         Physical Exam    Nursing note and vitals reviewed.  Constitutional: Vital signs are normal. He appears well-developed and well-nourished. He is not diaphoretic. No distress.   HENT:   Head: Normocephalic and atraumatic.   Right Ear: Hearing, tympanic membrane, external ear and ear canal normal.   Left Ear: Hearing, tympanic membrane, external ear and ear canal normal.   Nose: Nose normal.   Mouth/Throat: Uvula is midline, oropharynx is clear and moist and mucous membranes are normal. No posterior oropharyngeal edema or posterior oropharyngeal erythema.   Eyes: Conjunctivae, EOM and lids are normal. Pupils are equal, round, and reactive to light.   Neck:   Normal range of motion.  Cardiovascular:  Normal rate, regular rhythm, normal heart sounds, intact distal pulses and normal pulses.           Pulmonary/Chest: Effort normal and breath sounds normal. No respiratory distress. He has no wheezes. He has no rhonchi.   Abdominal: Abdomen is soft. There is no abdominal tenderness.   Musculoskeletal:         General: Normal range of motion.      Cervical back: Normal range of  motion. No rigidity.     Neurological: He is alert and oriented to person, place, and time.   Skin: Skin is warm and dry. No rash noted.   On exam, patient has swelling to bilateral eyelids.  Urticaria of the side of the eyes and the forehead.     Psychiatric: He has a normal mood and affect. His behavior is normal. Judgment and thought content normal.         ED Course   Procedures  Labs Reviewed - No data to display       Imaging Results    None          Medications   diphenhydrAMINE capsule 50 mg (50 mg Oral Given 3/17/24 1948)   famotidine tablet 20 mg (20 mg Oral Given 3/17/24 1948)   dexAMETHasone injection 10 mg (10 mg Intramuscular Given 3/17/24 1949)     Medical Decision Making  This is an emergent evaluation of 64 y.o. male in the ED presenting for allergic reaction. Physical exam reveals a non-toxic, afebrile, and well-appearing male in no apparent respiratory distress. Pertinent physical exam findings above. Vital signs stable. If available, previous records reviewed.    My overall impression is allergic reaction. Differential Diagnoses:  Allergic reaction, drug reaction, angioedema    Allergic reaction return precautions reviewed with the patient, including SOB, difficulty breathing, throat swelling, tongue swelling, voice change, nausea, vomiting, or worsening rash.     Discharge Meds/Instructions:  Medrol Dosepak, Pepcid, Zyrtec, EpiPen.  PCP follow up.    There does not appear to be any indication for further emergent testing, observation, or hospitalization at this time. A mutual shared decision making discussion was had with the patient. Patient appears stable for and is comfortable with discharge home. The diagnosis, treatment plan, instructions for follow-up as well as ED return precautions were discussed. Advised to follow-up with PCP for outpatient follow-up in 2-3 days. Signs and symptoms that would warrant immediate return to ED were reviewed prior to discharge. All questions and concerns  were asked, answered, and addressed. Patient expressed understanding and agreement with the plan.     This case was discussed with  my attending, Dr. Park who is in agreement with my assessment and plan.      Risk  OTC drugs.  Prescription drug management.               ED Course as of 03/17/24 2345   Sun Mar 17, 2024   2045 Discussed with my attending Dr. Park.  Will discharge the patient with a Medrol Dosepak, Zyrtec, Pepcid, and EpiPen. [LH]      ED Course User Index  [LH] Deena Olivarez PA-C                           Clinical Impression:  Final diagnoses:  [T78.40XA] Allergic reaction, initial encounter (Primary)  [H57.89] Eye swelling, bilateral          ED Disposition Condition    Discharge Stable          ED Prescriptions       Medication Sig Dispense Start Date End Date Auth. Provider    methylPREDNISolone (MEDROL DOSEPACK) 4 mg tablet use as directed 21 each 3/17/2024 4/7/2024 Deena Olivarez PA-C    famotidine (PEPCID) 20 MG tablet Take 1 tablet (20 mg total) by mouth 2 (two) times daily. for 10 days 20 tablet 3/17/2024 3/27/2024 Deena Olivarez PA-C    cetirizine (ZYRTEC) 10 MG tablet Take 1 tablet (10 mg total) by mouth once daily. 30 tablet 3/17/2024 4/16/2024 Deena Olivarez PA-C    EPINEPHrine (EPIPEN) 0.3 mg/0.3 mL AtIn (Expires today) Inject 0.3 mLs (0.3 mg total) into the muscle once. for 1 dose 0.3 mL 3/17/2024 3/17/2024 Deena Olivarez PA-C          Follow-up Information    None          Deena Olivarez PA-C  03/17/24 2346

## 2024-03-18 NOTE — DISCHARGE INSTRUCTIONS
Take steroids as prescribed. Take Pepcid daily. Take Benadryl as needed, recommend taking at night as this will cause drowsiness. Return to ER if you develop any shortness of breath, difficulty breathing, throat swelling, tongue swelling, voice change, nausea, vomiting, or worsening rash.

## 2024-03-20 ENCOUNTER — TELEPHONE (OUTPATIENT)
Dept: ENDOSCOPY | Facility: HOSPITAL | Age: 64
End: 2024-03-20
Payer: MEDICARE

## 2024-03-20 NOTE — TELEPHONE ENCOUNTER
Telephone patient to schedule colonoscopy with no answer. Direct contact left to call back to schedule.

## 2024-03-22 ENCOUNTER — TELEPHONE (OUTPATIENT)
Dept: ENDOSCOPY | Facility: HOSPITAL | Age: 64
End: 2024-03-22
Payer: MEDICARE

## 2024-03-22 ENCOUNTER — PATIENT MESSAGE (OUTPATIENT)
Dept: ENDOSCOPY | Facility: HOSPITAL | Age: 64
End: 2024-03-22
Payer: MEDICARE

## 2024-03-22 DIAGNOSIS — K63.5 POLYP OF COLON, UNSPECIFIED PART OF COLON, UNSPECIFIED TYPE: Primary | ICD-10-CM

## 2024-03-22 NOTE — TELEPHONE ENCOUNTER
Called pt to schedule colonoscopy with no answer. Third attempt made to reach pt with no return call. Letter sent to address on file. Direct phone number left on voicemail for future scheduling. Order cancelled at this time.

## 2024-03-22 NOTE — TELEPHONE ENCOUNTER
Spoke to pts spouse to schedule procedure(s) Colonoscopy       Physician to perform procedure(s) Dr. SOHEILA Saleh  Date of Procedure (s) 5/22/24  Arrival Time 8:00 AM  Time of Procedure(s) 9:00 AM   Location of Procedure(s) [facility]nd Floor  Type of Rx Prep sent to patient: PEG  Instructions provided to patient via [facility]    Patient was informed on the following information and verbalized understanding. Screening questionnaire reviewed with patient and complete. If procedure requires anesthesia, a responsible adult needs to be present to accompany the patient home, patient cannot drive after receiving anesthesia. Appointment details are tentative, especially check-in time. Patient will receive a prep-op call 7 days prior to confirm check-in time for procedure. If applicable the patient should contact their pharmacy to verify Rx for procedure prep is ready for pick-up. Patient was advised to call the scheduling department at [phone] if pharmacy states no Rx is available. Patient was advised to call the endoscopy scheduling department if any questions or concerns arise.      [facility] Scheduling Department

## 2024-03-26 ENCOUNTER — TELEPHONE (OUTPATIENT)
Dept: ENDOSCOPY | Facility: HOSPITAL | Age: 64
End: 2024-03-26
Payer: MEDICARE

## 2024-04-02 ENCOUNTER — OFFICE VISIT (OUTPATIENT)
Dept: ORTHOPEDICS | Facility: CLINIC | Age: 64
End: 2024-04-02
Payer: MEDICARE

## 2024-04-02 DIAGNOSIS — M17.0 PRIMARY OSTEOARTHRITIS OF BOTH KNEES: Primary | ICD-10-CM

## 2024-04-02 PROCEDURE — 1159F MED LIST DOCD IN RCRD: CPT | Mod: CPTII,S$GLB,, | Performed by: PHYSICIAN ASSISTANT

## 2024-04-02 PROCEDURE — 99999 PR PBB SHADOW E&M-EST. PATIENT-LVL II: CPT | Mod: PBBFAC,,, | Performed by: PHYSICIAN ASSISTANT

## 2024-04-02 PROCEDURE — 20610 DRAIN/INJ JOINT/BURSA W/O US: CPT | Mod: 50,S$GLB,, | Performed by: PHYSICIAN ASSISTANT

## 2024-04-02 PROCEDURE — 99213 OFFICE O/P EST LOW 20 MIN: CPT | Mod: 25,S$GLB,, | Performed by: PHYSICIAN ASSISTANT

## 2024-04-02 PROCEDURE — 1160F RVW MEDS BY RX/DR IN RCRD: CPT | Mod: CPTII,S$GLB,, | Performed by: PHYSICIAN ASSISTANT

## 2024-04-02 RX ORDER — TRIAMCINOLONE ACETONIDE 40 MG/ML
40 INJECTION, SUSPENSION INTRA-ARTICULAR; INTRAMUSCULAR
Status: DISCONTINUED | OUTPATIENT
Start: 2024-04-02 | End: 2024-04-02 | Stop reason: HOSPADM

## 2024-04-02 RX ORDER — LIDOCAINE HYDROCHLORIDE 10 MG/ML
2 INJECTION INFILTRATION; PERINEURAL
Status: DISCONTINUED | OUTPATIENT
Start: 2024-04-02 | End: 2024-04-02 | Stop reason: HOSPADM

## 2024-04-02 RX ADMIN — LIDOCAINE HYDROCHLORIDE 2 ML: 10 INJECTION INFILTRATION; PERINEURAL at 07:04

## 2024-04-02 RX ADMIN — TRIAMCINOLONE ACETONIDE 40 MG: 40 INJECTION, SUSPENSION INTRA-ARTICULAR; INTRAMUSCULAR at 07:04

## 2024-04-02 NOTE — PROGRESS NOTES
Patient ID: Bobby Tavarez is a 64 y.o. male.    Chief Complaint:bilateral knee pain      HISTORY:  Bobby Tavarez is a 64 y.o. male who returns to me today for follow up of bilateral knee pain.  He was last seen by me 5/23/2023.  He had injections with very good relief.  His pain worsened a couple of months ago.  He has been working on weight loss so he may be a surgical candidate.      PMH/PSH/FamHx/SocHx:    Unchanged from prior visit.    ROS:  Constitution: Negative for chills, fever and weakness.   Respiratory: Negative for cough and shortness of breath.   Musculoskeletal: Positive for bilateral knee pain  Psychiatric/Behavioral: The patient is not nervous/anxious.       PHYSICAL EXAM:   Bilateral knee  Skin intact  No warmth or effusion  ROM 0-120  Stable to testing    ASSESSMENT/PLAN:    Diagnoses and all orders for this visit:    Primary osteoarthritis of both knees  -     Large Joint Aspiration/Injection: bilateral knee      - Bilateral knee CSI performed  - Rest, ice as needed  - Follow up if symptoms worsen or fail to improve    If there are any questions prior to this, the patient was instructed to contact the office.

## 2024-04-02 NOTE — PROCEDURES
Large Joint Aspiration/Injection: bilateral knee    Date/Time: 4/2/2024 7:00 PM    Performed by: Lorna Young PA-C  Authorized by: Lorna Young PA-C    Consent Done?:  Yes (Verbal)  Indications:  Pain  Timeout: prior to procedure the correct patient, procedure, and site was verified    Prep: patient was prepped and draped in usual sterile fashion    Local anesthetic:  Topical anesthetic    Details:  Needle Size:  22 G  Approach:  Anterolateral  Location:  Knee  Laterality:  Bilateral  Site:  Bilateral knee  Medications (Right):  40 mg triamcinolone acetonide 40 mg/mL; 2 mL LIDOcaine HCL 10 mg/ml (1%) 10 mg/mL (1 %)  Medications (Left):  40 mg triamcinolone acetonide 40 mg/mL; 2 mL LIDOcaine HCL 10 mg/ml (1%) 10 mg/mL (1 %)  Patient tolerance:  Patient tolerated the procedure well with no immediate complications

## 2024-05-15 ENCOUNTER — TELEPHONE (OUTPATIENT)
Dept: ENDOSCOPY | Facility: HOSPITAL | Age: 64
End: 2024-05-15
Payer: MEDICARE

## 2024-05-15 NOTE — TELEPHONE ENCOUNTER
Confirmed appt for 5/22/24 with pt wife. Confirmed receipt of prep and instructions. Reviewed instructions pt wife denies pt taking blood thinning meds. Last ozempic dose 5/9/24. Confirmed receipt of prep and prep instructions in portal. All questions answered.wife verbalized understanding

## 2024-05-20 ENCOUNTER — TELEPHONE (OUTPATIENT)
Dept: GASTROENTEROLOGY | Facility: CLINIC | Age: 64
End: 2024-05-20
Payer: MEDICARE

## 2024-05-20 NOTE — TELEPHONE ENCOUNTER
----- Message from Dario Crawford sent at 5/20/2024  4:18 PM CDT -----  Type:  Procedure     Who Called:wife   Does the patient know what this is regarding?:procedure time   Would the patient rather a call back or a response via [facility]? Call   Best Call Back Number: [phone]  Additional Information:

## 2024-05-22 ENCOUNTER — HOSPITAL ENCOUNTER (OUTPATIENT)
Facility: HOSPITAL | Age: 64
Discharge: HOME | End: 2024-05-22
Attending: INTERNAL MEDICINE | Admitting: INTERNAL MEDICINE
Payer: MEDICARE

## 2024-05-22 ENCOUNTER — ANESTHESIA EVENT (OUTPATIENT)
Dept: ENDOSCOPY | Facility: HOSPITAL | Age: 64
End: 2024-05-22
Payer: MEDICARE

## 2024-05-22 ENCOUNTER — ANESTHESIA (OUTPATIENT)
Dept: ENDOSCOPY | Facility: HOSPITAL | Age: 64
End: 2024-05-22
Payer: MEDICARE

## 2024-05-22 VITALS
HEART RATE: 60 BPM | OXYGEN SATURATION: 98 % | HEIGHT: 66 IN | TEMPERATURE: 98 F | DIASTOLIC BLOOD PRESSURE: 71 MMHG | WEIGHT: 294 LBS | RESPIRATION RATE: 16 BRPM | SYSTOLIC BLOOD PRESSURE: 110 MMHG | BODY MASS INDEX: 47.25 KG/M2

## 2024-05-22 DIAGNOSIS — K63.5 COLON POLYP: ICD-10-CM

## 2024-05-22 DIAGNOSIS — Z85.038 ENCOUNTER FOR FOLLOW-UP SURVEILLANCE OF COLON CANCER: Primary | ICD-10-CM

## 2024-05-22 DIAGNOSIS — Z08 ENCOUNTER FOR FOLLOW-UP SURVEILLANCE OF COLON CANCER: Primary | ICD-10-CM

## 2024-05-22 LAB
POCT GLUCOSE: 77 MG/DL (ref 70–110)
POCT GLUCOSE: 83 MG/DL (ref 70–110)

## 2024-05-22 PROCEDURE — 25000003 PHARM REV CODE 250: Performed by: NURSE ANESTHETIST, CERTIFIED REGISTERED

## 2024-05-22 PROCEDURE — 45385 COLONOSCOPY W/LESION REMOVAL: CPT | Mod: PT,,, | Performed by: INTERNAL MEDICINE

## 2024-05-22 PROCEDURE — D9220A PRA ANESTHESIA: Mod: PT,CRNA,, | Performed by: NURSE ANESTHETIST, CERTIFIED REGISTERED

## 2024-05-22 PROCEDURE — 45385 COLONOSCOPY W/LESION REMOVAL: CPT | Mod: PT | Performed by: INTERNAL MEDICINE

## 2024-05-22 PROCEDURE — 37000009 HC ANESTHESIA EA ADD 15 MINS: Performed by: INTERNAL MEDICINE

## 2024-05-22 PROCEDURE — 88305 TISSUE EXAM BY PATHOLOGIST: CPT | Performed by: PATHOLOGY

## 2024-05-22 PROCEDURE — D9220A PRA ANESTHESIA: Mod: PT,ANES,, | Performed by: ANESTHESIOLOGY

## 2024-05-22 PROCEDURE — 88305 TISSUE EXAM BY PATHOLOGIST: CPT | Mod: 26,,, | Performed by: PATHOLOGY

## 2024-05-22 PROCEDURE — 63600175 PHARM REV CODE 636 W HCPCS: Performed by: NURSE ANESTHETIST, CERTIFIED REGISTERED

## 2024-05-22 PROCEDURE — 37000008 HC ANESTHESIA 1ST 15 MINUTES: Performed by: INTERNAL MEDICINE

## 2024-05-22 PROCEDURE — 27201089 HC SNARE, DISP (ANY): Performed by: INTERNAL MEDICINE

## 2024-05-22 RX ORDER — SODIUM CHLORIDE 9 MG/ML
INJECTION, SOLUTION INTRAVENOUS CONTINUOUS
Status: CANCELLED | OUTPATIENT
Start: 2024-05-22

## 2024-05-22 RX ORDER — SODIUM CHLORIDE 0.9 % (FLUSH) 0.9 %
3 SYRINGE (ML) INJECTION
Status: DISCONTINUED | OUTPATIENT
Start: 2024-05-22 | End: 2024-05-22 | Stop reason: HOSPADM

## 2024-05-22 RX ORDER — SODIUM CHLORIDE 0.9 % (FLUSH) 0.9 %
10 SYRINGE (ML) INJECTION
Status: CANCELLED | OUTPATIENT
Start: 2024-05-22

## 2024-05-22 RX ORDER — PROPOFOL 10 MG/ML
VIAL (ML) INTRAVENOUS
Status: DISCONTINUED | OUTPATIENT
Start: 2024-05-22 | End: 2024-05-22

## 2024-05-22 RX ADMIN — PROPOFOL 70 MG: 10 INJECTION, EMULSION INTRAVENOUS at 09:05

## 2024-05-22 RX ADMIN — SODIUM CHLORIDE: 0.9 INJECTION, SOLUTION INTRAVENOUS at 09:05

## 2024-05-22 NOTE — TRANSFER OF CARE
"Anesthesia Transfer of Care Note    Patient: Bobby Tavarez    Procedure(s) Performed: Procedure(s) (LRB):  COLONOSCOPY (N/A)    Patient location: [facility]    Anesthesia Type: general    Transport from OR: Transported from OR on room air with adequate spontaneous ventilation    Post pain: adequate analgesia    Post assessment: no apparent anesthetic complications    Post vital signs: stable    Level of consciousness: lethargic    Nausea/Vomiting: no nausea/vomiting    Complications: none    Transfer of care protocol was followed    Last vitals: Visit Vitals  /71 (BP Location: Left arm, Patient Position: Sitting)   Pulse 88   Temp 36.8 °C (98.2 °F) (Temporal)   Resp 16   Ht 5' 6" (1.676 m)   Wt 133.4 kg (294 lb)   SpO2 95%   BMI 47.45 kg/m²     "

## 2024-05-22 NOTE — ANESTHESIA PREPROCEDURE EVALUATION
05/22/2024  Bobby Tavarez is a 64 y.o., male.      Pre-op Assessment    I have reviewed the Patient Summary Reports.     I have reviewed the Nursing Notes. I have reviewed the NPO Status.   I have reviewed the Medications.     Review of Systems  Anesthesia Hx:  No problems with previous Anesthesia   History of prior surgery of interest to airway management or planning:          Denies Family Hx of Anesthesia complications.    Denies Personal Hx of Anesthesia complications.                    Hematology/Oncology:  Hematology Normal   Oncology Normal                                   EENT/Dental:  EENT/Dental Normal           Cardiovascular:  Exercise tolerance: good   Hypertension           hyperlipidemia                             Pulmonary:  Pulmonary Normal                       Renal/:  Renal/ Normal                 Hepatic/GI:  Hepatic/GI Normal                 Musculoskeletal:  Musculoskeletal Normal                Neurological:  Neurology Normal                                      Endocrine:  Diabetes, type 2         Morbid Obesity / BMI > 40  Dermatological:  Skin Normal    Psych:  Psychiatric Normal                    Physical Exam  General: Well nourished, Cooperative, Alert and Oriented    Airway:  Mallampati: II   Mouth Opening: Normal  TM Distance: Normal  Tongue: Normal  Neck ROM: Normal ROM    Dental:  Intact        Anesthesia Plan  Type of Anesthesia, risks & benefits discussed:    Anesthesia Type: Gen Natural Airway  Intra-op Monitoring Plan: Standard ASA Monitors  Induction:  IV  Informed Consent: Informed consent signed with the Patient and all parties understand the risks and agree with anesthesia plan.  All questions answered.   ASA Score: 3  Day of Surgery Review of History & Physical: H&P Update referred to the surgeon/provider.    Ready For Surgery From Anesthesia Perspective.      .

## 2024-05-22 NOTE — PROVATION PATIENT INSTRUCTIONS
Discharge Summary/Instructions after an Endoscopic Procedure  Patient Name: Bobby Tavarez  Patient MRN: [id number]  Patient YOB: 1960  Wednesday, May 22, 2024  Bj Saleh MD  Dear patient,  As a result of recent federal legislation (The Federal Cures Act), you may   receive lab or pathology results from your procedure in your [facility]   account before your physician is able to contact you. Your physician or   their representative will relay the results to you with their   recommendations at their soonest availability.  Thank you,  RESTRICTIONS:  During your procedure today, you received medications for sedation.  These   medications may affect your judgment, balance and coordination.  Therefore,   for 24 hours, you have the following restrictions:   - DO NOT drive a car, operate machinery, make legal/financial decisions,   sign important papers or drink alcohol.    ACTIVITY:  Today: no heavy lifting, straining or running due to procedural   sedation/anesthesia.  The following day: return to full activity including work.  DIET:  Eat and drink normally unless instructed otherwise.     TREATMENT FOR COMMON SIDE EFFECTS:  - Mild abdominal pain, nausea, belching, bloating or excessive gas:  rest,   eat lightly and use a heating pad.  - Sore Throat: treat with throat lozenges and/or gargle with warm salt   water.  - Because air was used during the procedure, expelling large amounts of air   from your rectum or belching is normal.  - If a bowel prep was taken, you may not have a bowel movement for 1-3 days.    This is normal.  SYMPTOMS TO WATCH FOR AND REPORT TO YOUR PHYSICIAN:  1. Abdominal pain or bloating, other than gas cramps.  2. Chest pain.  3. Back pain.  4. Signs of infection such as: chills or fever occurring within 24 hours   after the procedure.  5. Rectal bleeding, which would show as bright red, maroon, or black stools.   (A tablespoon of blood from the rectum is not serious, especially if    hemorrhoids are present.)  6. Vomiting.  7. Weakness or dizziness.  GO DIRECTLY TO THE NEAREST EMERGENCY ROOM IF YOU HAVE ANY OF THE FOLLOWING:      Difficulty breathing              Chills and/or fever over 101 F   Persistent vomiting and/or vomiting blood   Severe abdominal pain   Severe chest pain   Black, tarry stools   Bleeding- more than one tablespoon   Any other symptom or condition that you feel may need urgent attention  Your doctor recommends these additional instructions:  If any biopsies were taken, your doctors clinic will contact you in 1 to 2   weeks with any results.  - Patient has a contact number available for emergencies.  The signs and   symptoms of potential delayed complications were discussed with the   patient.  Return to normal activities tomorrow.  Written discharge   instructions were provided to the patient.   - Resume previous diet.   - Discharge patient to home (with escort).   - Continue present medications.   - Await pathology results.   - Repeat colonoscopy in 3 years for surveillance.   - Return to primary care physician as previously scheduled.  For questions, problems or results please call your physician - Bj Saleh MD at Work:  [phone].  [facility], EMERGENCY ROOM PHONE NUMBER: [phone]  IF A COMPLICATION OR EMERGENCY SITUATION ARISES AND YOU ARE UNABLE TO REACH   YOUR PHYSICIAN - GO DIRECTLY TO THE EMERGENCY ROOM.  Bj Saleh MD  5/22/2024 10:29:03 AM  This report has been verified and signed electronically.  Dear patient,  As a result of recent federal legislation (The Federal Cures Act), you may   receive lab or pathology results from your procedure in your [facility]   account before your physician is able to contact you. Your physician or   their representative will relay the results to you with their   recommendations at their soonest availability.  Thank you,  PROVATION

## 2024-05-22 NOTE — H&P
Short Stay Endoscopy History and Physical    PCP - Jame Jean MD    Procedure - Colonoscopy  ASA - per anesthesia  Mallampati - per anesthesia  History of Anesthesia problems - no  Family history Anesthesia problems -  no   Plan of anesthesia - General    HPI:  This is a 64 y.o. male here for evaluation of : Surveillance of advanced adenoma s/p EMR on 11/5/2023    ROS:  Constitutional: No fevers, chills  CV: No chest pain  Pulm: No shortness of breath  GI: see HPI  Derm: No rash    Medical History:  has a past medical history of Essential (primary) hypertension, Hyperlipidemia, Obesity, morbid, BMI 50 or higher, Osteoarthritis of both knees, and Prediabetes.    Surgical History:  has a past surgical history that includes Colonoscopy (N/A, 10/6/2023) and Colonoscopy (N/A, 11/15/2023).    Family History: family history includes Diabetes in his mother.. Otherwise no colon cancer, inflammatory bowel disease, or GI malignancies.    Social History:  reports that he has never smoked. He has never used smokeless tobacco. He reports that he does not drink alcohol and does not use drugs.    Review of patient's allergies indicates:  No Known Allergies    Medications:   Medications Prior to Admission   Medication Sig Dispense Refill Last Dose    amLODIPine (NORVASC) 10 MG tablet Take 1 tablet (10 mg total) by mouth once daily. 90 tablet 3 5/22/2024    cholecalciferol, vitamin D3, (VITAMIN D3) 50 mcg (2,000 unit) Cap capsule Vitamin D3 50 mcg (2,000 unit) capsule   Take 1 capsule every day by oral route.   5/21/2024    LIDOcaine (LIDODERM) 5 % Place 1 patch onto the skin once daily. Remove & Discard patch within 12 hours or as directed by MD 30 patch 0 5/21/2024    meloxicam (MOBIC) 7.5 MG tablet Take 1 tablet (7.5 mg total) by mouth once daily. 90 tablet 3 5/21/2024    metFORMIN (GLUCOPHAGE-XR) 500 MG ER 24hr tablet Take 1 tablet (500 mg total) by mouth daily with breakfast. 90 tablet 1 Past Week    semaglutide (OZEMPIC)  0.25 mg or 0.5 mg (2 mg/3 mL) pen injector Inject 0.25 mg into the skin every 7 days. 3 mL 0 Past Month    SENNA 8.6 mg tablet Take 1 tablet by mouth once daily. 90 tablet 1 5/21/2024    atorvastatin (LIPITOR) 40 MG tablet Take 1 tablet (40 mg total) by mouth once daily. 90 tablet 1     cetirizine (ZYRTEC) 10 MG tablet Take 1 tablet (10 mg total) by mouth once daily. 30 tablet 0     EPINEPHrine (EPIPEN) 0.3 mg/0.3 mL AtIn Inject 0.3 mLs (0.3 mg total) into the muscle once. for 1 dose 0.3 mL 0     famotidine (PEPCID) 20 MG tablet Take 1 tablet (20 mg total) by mouth 2 (two) times daily. for 10 days 20 tablet 0          Physical Exam:    Vital Signs:   Vitals:    05/22/24 0843   BP: 115/71   Pulse: 88   Resp: 16   Temp: 98.2 °F (36.8 °C)       General Appearance: Well appearing in no acute distress  Eyes:    No scleral icterus  ENT: lips and tongue normal  Lungs: no use of accessory muscles  Heart:  normal rate, regular rhythm  Abdomen: Soft, non tender, non distended   Extremities: no edema  Skin: No rash      Labs:  Lab Results   Component Value Date    WBC 6.31 05/12/2023    HGB 14.3 05/12/2023    HCT 43.5 05/12/2023     05/12/2023    CHOL 193 05/12/2023    TRIG 59 05/12/2023    HDL 41 05/12/2023    ALT 24 05/12/2023    AST 22 05/12/2023     05/12/2023    K 3.7 05/12/2023     05/12/2023    CREATININE 1.1 05/12/2023    BUN 9 05/12/2023    CO2 24 05/12/2023    TSH 1.459 05/12/2023    PSA 0.36 05/12/2023    HGBA1C 5.7 (H) 05/12/2023       I have explained the risks and benefits of endoscopy procedures to the patient including but not limited to bleeding, perforation, infection, and death.  The patient was asked if they understand and allowed to ask any further questions to their satisfaction.    Josue Cuello MD

## 2024-05-22 NOTE — ANESTHESIA POSTPROCEDURE EVALUATION
Anesthesia Post Evaluation    Patient: Bobby Tavarez    Procedure(s) Performed: Procedure(s) (LRB):  COLONOSCOPY (N/A)    Final Anesthesia Type: general      Patient location during evaluation: PACU  Patient participation: Yes- Able to Participate  Level of consciousness: awake and alert and oriented  Post-procedure vital signs: reviewed and stable  Pain management: adequate  Airway patency: patent    PONV status at discharge: No PONV  Anesthetic complications: no      Cardiovascular status: blood pressure returned to baseline  Respiratory status: unassisted, room air and spontaneous ventilation  Hydration status: euvolemic  Follow-up not needed.              Vitals Value Taken Time   BP 99/63 05/22/24 1001   Temp 37.1 °C (98.7 °F) 05/22/24 0946   Pulse 69 05/22/24 1011   Resp 16 05/22/24 0946   SpO2 97 % 05/22/24 1011   Vitals shown include unfiled device data.      No case tracking events are documented in the log.      Pain/Aleena Score: No data recorded

## 2024-05-24 LAB
FINAL PATHOLOGIC DIAGNOSIS: NORMAL
GROSS: NORMAL
Lab: NORMAL

## 2024-07-09 ENCOUNTER — LAB VISIT (OUTPATIENT)
Dept: LAB | Facility: HOSPITAL | Age: 64
End: 2024-07-09
Payer: MEDICARE

## 2024-07-09 ENCOUNTER — OFFICE VISIT (OUTPATIENT)
Dept: INTERNAL MEDICINE | Facility: CLINIC | Age: 64
End: 2024-07-09
Payer: MEDICARE

## 2024-07-09 VITALS
BODY MASS INDEX: 45.88 KG/M2 | HEIGHT: 66 IN | WEIGHT: 285.5 LBS | SYSTOLIC BLOOD PRESSURE: 118 MMHG | DIASTOLIC BLOOD PRESSURE: 80 MMHG

## 2024-07-09 DIAGNOSIS — R79.9 ABNORMAL FINDING OF BLOOD CHEMISTRY, UNSPECIFIED: ICD-10-CM

## 2024-07-09 DIAGNOSIS — Z12.5 ENCOUNTER FOR SCREENING FOR MALIGNANT NEOPLASM OF PROSTATE: ICD-10-CM

## 2024-07-09 DIAGNOSIS — Z00.00 ANNUAL PHYSICAL EXAM: Primary | ICD-10-CM

## 2024-07-09 DIAGNOSIS — M17.0 OSTEOARTHRITIS OF BOTH KNEES, UNSPECIFIED OSTEOARTHRITIS TYPE: ICD-10-CM

## 2024-07-09 DIAGNOSIS — Z00.00 ANNUAL PHYSICAL EXAM: ICD-10-CM

## 2024-07-09 DIAGNOSIS — E78.5 HYPERLIPIDEMIA, UNSPECIFIED HYPERLIPIDEMIA TYPE: ICD-10-CM

## 2024-07-09 DIAGNOSIS — L72.3 SEBACEOUS CYST OF LEFT AXILLA: ICD-10-CM

## 2024-07-09 DIAGNOSIS — I10 HYPERTENSION, UNSPECIFIED TYPE: ICD-10-CM

## 2024-07-09 DIAGNOSIS — E66.01 CLASS 3 SEVERE OBESITY WITH SERIOUS COMORBIDITY AND BODY MASS INDEX (BMI) OF 40.0 TO 44.9 IN ADULT, UNSPECIFIED OBESITY TYPE: ICD-10-CM

## 2024-07-09 LAB
ALBUMIN SERPL BCP-MCNC: 3.4 G/DL (ref 3.5–5.2)
ALP SERPL-CCNC: 90 U/L (ref 55–135)
ALT SERPL W/O P-5'-P-CCNC: 31 U/L (ref 10–44)
ANION GAP SERPL CALC-SCNC: 9 MMOL/L (ref 8–16)
AST SERPL-CCNC: 23 U/L (ref 10–40)
BASOPHILS # BLD AUTO: 0.05 K/UL (ref 0–0.2)
BASOPHILS NFR BLD: 0.6 % (ref 0–1.9)
BILIRUB SERPL-MCNC: 0.4 MG/DL (ref 0.1–1)
BUN SERPL-MCNC: 10 MG/DL (ref 8–23)
CALCIUM SERPL-MCNC: 9 MG/DL (ref 8.7–10.5)
CHLORIDE SERPL-SCNC: 108 MMOL/L (ref 95–110)
CHOLEST SERPL-MCNC: 138 MG/DL (ref 120–199)
CHOLEST/HDLC SERPL: 3.7 {RATIO} (ref 2–5)
CO2 SERPL-SCNC: 24 MMOL/L (ref 23–29)
COMPLEXED PSA SERPL-MCNC: 0.4 NG/ML (ref 0–4)
CREAT SERPL-MCNC: 1 MG/DL (ref 0.5–1.4)
DIFFERENTIAL METHOD BLD: ABNORMAL
EOSINOPHIL # BLD AUTO: 0.2 K/UL (ref 0–0.5)
EOSINOPHIL NFR BLD: 2.9 % (ref 0–8)
ERYTHROCYTE [DISTWIDTH] IN BLOOD BY AUTOMATED COUNT: 15.9 % (ref 11.5–14.5)
EST. GFR  (NO RACE VARIABLE): >60 ML/MIN/1.73 M^2
ESTIMATED AVG GLUCOSE: 114 MG/DL (ref 68–131)
GLUCOSE SERPL-MCNC: 70 MG/DL (ref 70–110)
HBA1C MFR BLD: 5.6 % (ref 4–5.6)
HCT VFR BLD AUTO: 45.8 % (ref 40–54)
HDLC SERPL-MCNC: 37 MG/DL (ref 40–75)
HDLC SERPL: 26.8 % (ref 20–50)
HGB BLD-MCNC: 14.9 G/DL (ref 14–18)
IMM GRANULOCYTES # BLD AUTO: 0.03 K/UL (ref 0–0.04)
IMM GRANULOCYTES NFR BLD AUTO: 0.4 % (ref 0–0.5)
LDLC SERPL CALC-MCNC: 92 MG/DL (ref 63–159)
LYMPHOCYTES # BLD AUTO: 2.3 K/UL (ref 1–4.8)
LYMPHOCYTES NFR BLD: 28.6 % (ref 18–48)
MCH RBC QN AUTO: 28.1 PG (ref 27–31)
MCHC RBC AUTO-ENTMCNC: 32.5 G/DL (ref 32–36)
MCV RBC AUTO: 86 FL (ref 82–98)
MONOCYTES # BLD AUTO: 0.8 K/UL (ref 0.3–1)
MONOCYTES NFR BLD: 9.6 % (ref 4–15)
NEUTROPHILS # BLD AUTO: 4.6 K/UL (ref 1.8–7.7)
NEUTROPHILS NFR BLD: 57.9 % (ref 38–73)
NONHDLC SERPL-MCNC: 101 MG/DL
NRBC BLD-RTO: 0 /100 WBC
PLATELET # BLD AUTO: 279 K/UL (ref 150–450)
PMV BLD AUTO: 10.8 FL (ref 9.2–12.9)
POTASSIUM SERPL-SCNC: 4.7 MMOL/L (ref 3.5–5.1)
PROT SERPL-MCNC: 7.5 G/DL (ref 6–8.4)
RBC # BLD AUTO: 5.3 M/UL (ref 4.6–6.2)
SODIUM SERPL-SCNC: 141 MMOL/L (ref 136–145)
TRIGL SERPL-MCNC: 45 MG/DL (ref 30–150)
WBC # BLD AUTO: 8.01 K/UL (ref 3.9–12.7)

## 2024-07-09 PROCEDURE — 83036 HEMOGLOBIN GLYCOSYLATED A1C: CPT

## 2024-07-09 PROCEDURE — 80061 LIPID PANEL: CPT

## 2024-07-09 PROCEDURE — 99999 PR PBB SHADOW E&M-EST. PATIENT-LVL III: CPT | Mod: PBBFAC,GC,,

## 2024-07-09 PROCEDURE — 36415 COLL VENOUS BLD VENIPUNCTURE: CPT

## 2024-07-09 PROCEDURE — 80053 COMPREHEN METABOLIC PANEL: CPT

## 2024-07-09 PROCEDURE — 84153 ASSAY OF PSA TOTAL: CPT

## 2024-07-09 PROCEDURE — 85025 COMPLETE CBC W/AUTO DIFF WBC: CPT

## 2024-07-09 RX ORDER — ATORVASTATIN CALCIUM 40 MG/1
40 TABLET, FILM COATED ORAL DAILY
Qty: 90 TABLET | Refills: 1 | Status: SHIPPED | OUTPATIENT
Start: 2024-07-09 | End: 2025-01-05

## 2024-07-09 RX ORDER — MELOXICAM 7.5 MG/1
7.5 TABLET ORAL DAILY
Qty: 90 TABLET | Refills: 1 | Status: SHIPPED | OUTPATIENT
Start: 2024-07-09

## 2024-07-09 RX ORDER — AMLODIPINE BESYLATE 10 MG/1
10 TABLET ORAL DAILY
Qty: 90 TABLET | Refills: 1 | Status: SHIPPED | OUTPATIENT
Start: 2024-07-09

## 2024-07-09 NOTE — PATIENT INSTRUCTIONS
You were seen today for an annual exam and to establish care with me. We discussed your medical history and your current medications. I ordered routine bloodwork to be done today and will follow-up the results of the bloodwork with you either over the Patient Portal on [facility] or via phone. I would like to see you again in 1 year. If referrals, tests, or imaging were ordered for you, they often will call you to schedule, but if you do not hear back in a day or two, please call Central Scheduling at [phone] to schedule any appointments. If you have any questions, please reach out through the Portal or call the clinic at [phone] and leave a message for me (Jame Jean).

## 2024-07-09 NOTE — PROGRESS NOTES
Internal Medicine Resident Clinic   Clinic Note    Patient Name: Bobby Tavarez   YOB: 1960     SUBJECTIVE:     Chief Complaint: Annual exam and follow-up for obesity    History of Present Illness:  Mr. Bobby Tavarez is a 64 y.o. male with history of HTN, HLD, pre-diabetes, BMI > 40 who presents for annual exam. His chronic medical issues are as follows.    HTN: Well controlled and uses a home BP cuff. Wife reports that SBP is typically in the 110s-120s. On Amlodipine 10mg.     HLD: Last lipids showed total chol 193 and . On atorvastatin 40mg.     OA: Bilateral knee OA, referred to orthopedics previously. On Mobic 7.5 and reports that the pain is well controlled. Saw Ortho after our last visit, and received bilateral steroid shots. Pain better today. Was able to go on weeklong cruise with lots of walking and no issues.     Prediabetes and Obesity, BMI 46: On our last visit, pt and wife expressed that lifestyle management alone for weight loss was not successful. I had started him on Metformin at a much earlier visit given that I felt he would have trouble getting GLP-1 agonist covered by insurance for only having pre-diabetes. He stopped MFM due to side effects. I recommended they look into private weight management clinics for prescription for Ozempic. They have and he has now been on Ozempic since the end of May, and now is on his 3rd week of the 1mg dose. Tolerating well. He has lost 10 pounds since my last visit 4-5 months ago. Has been limiting his snacking and sweets intake.    Left axilla cyst: Today reports presence of a bump under his left armpit. Has been there 3 or 4 years. Never been painful or tender, or anything draining from it, but feels that it has gotten larger recently.    Review of Systems   Constitutional:  Negative for chills and fever.   HENT:  Negative for congestion and sore throat.    Eyes:  Negative for double vision and photophobia.   Respiratory:  Negative for  cough and shortness of breath.    Cardiovascular:  Negative for chest pain and palpitations.   Gastrointestinal:  Negative for abdominal pain and vomiting.   Genitourinary:  Negative for dysuria and urgency.   Musculoskeletal:  Positive for joint pain. Negative for myalgias and neck pain.   Neurological:  Negative for dizziness and weakness.   Psychiatric/Behavioral:  Negative for depression. The patient is not nervous/anxious.        PAST HISTORY:     Past Medical History:   Diagnosis Date    Essential (primary) hypertension     Hyperlipidemia     Obesity, morbid, BMI 50 or higher     Osteoarthritis of both knees     Prediabetes        Past Surgical History:   Procedure Laterality Date    COLONOSCOPY N/A 10/6/2023    Procedure: COLONOSCOPY;  Surgeon: Edwina Garcia MD;  [facility]  Service: Endoscopy;  Laterality: N/A;  10/2 BMI: 48.68  Not taking Ozempic  Referral:  Jame Jean MD  Extended PEG prep  Inst emailed to [email or web address]  LW  10/4-precall complete-Kpvt  10/5-pt notified of floor change-Kpvt    COLONOSCOPY N/A 11/15/2023    Procedure: COLONOSCOPY;  Surgeon: Bj Saleh MD;  Location: [facility] ([address] [place] [address]);  Service: Endoscopy;  Laterality: N/A;  10/20/23-PEG extended prep, instructions via portal, pt is not taking Ozempic-DS  11/9-precall complete-MS    COLONOSCOPY N/A 5/22/2024    Procedure: COLONOSCOPY;  Surgeon: Bj Saleh MD;  Location: [facility] ([address] [place] [address]);  Service: Endoscopy;  Laterality: N/A;  3/22/24: instructions sent via portal. PEG prep-GD   Repeat colonoscopy in 6 months for surveillance.-teodora  5/15-pre call complete-confirmed last dose of ozempic 5/9/24       Family History   Problem Relation Name Age of Onset    Diabetes Mother         Social History     Socioeconomic History    Marital status:    Occupational History    Occupation: , retired   Tobacco Use    Smoking status: Never    Smokeless tobacco: Never  "  Substance and Sexual Activity    Alcohol use: Never    Drug use: Never       MEDICATIONS & ALLERGIES:     Current Outpatient Medications on File Prior to Visit   Medication Sig    cetirizine (ZYRTEC) 10 MG tablet Take 1 tablet (10 mg total) by mouth once daily.    cholecalciferol, vitamin D3, (VITAMIN D3) 50 mcg (2,000 unit) Cap capsule Vitamin D3 50 mcg (2,000 unit) capsule   Take 1 capsule every day by oral route.    EPINEPHrine (EPIPEN) 0.3 mg/0.3 mL AtIn Inject 0.3 mLs (0.3 mg total) into the muscle once. for 1 dose    famotidine (PEPCID) 20 MG tablet Take 1 tablet (20 mg total) by mouth 2 (two) times daily. for 10 days    LIDOcaine (LIDODERM) 5 % Place 1 patch onto the skin once daily. Remove & Discard patch within 12 hours or as directed by MD    semaglutide (OZEMPIC) 0.25 mg or 0.5 mg (2 mg/3 mL) pen injector Inject 0.25 mg into the skin every 7 days.    SENNA 8.6 mg tablet Take 1 tablet by mouth once daily.    [DISCONTINUED] amLODIPine (NORVASC) 10 MG tablet Take 1 tablet (10 mg total) by mouth once daily.    [DISCONTINUED] atorvastatin (LIPITOR) 40 MG tablet Take 1 tablet (40 mg total) by mouth once daily.    [DISCONTINUED] meloxicam (MOBIC) 7.5 MG tablet Take 1 tablet (7.5 mg total) by mouth once daily.    [DISCONTINUED] metFORMIN (GLUCOPHAGE-XR) 500 MG ER 24hr tablet Take 1 tablet (500 mg total) by mouth daily with breakfast.     No current facility-administered medications on file prior to visit.       Review of patient's allergies indicates:  No Known Allergies    OBJECTIVE:     Vital Signs:  Vitals:    07/09/24 1315   BP: 118/80   BP Location: Right arm   Weight: 129.5 kg (285 lb 7.9 oz)   Height: 5' 6" (1.676 m)       Body mass index is 46.08 kg/m².     Physical Exam  Vitals reviewed.   Constitutional:       Appearance: Normal appearance. He is obese.   HENT:      Head: Normocephalic and atraumatic.   Eyes:      Extraocular Movements: Extraocular movements intact.      Conjunctiva/sclera: " "Conjunctivae normal.   Cardiovascular:      Rate and Rhythm: Normal rate and regular rhythm.      Pulses: Normal pulses.      Heart sounds: Normal heart sounds.   Pulmonary:      Effort: Pulmonary effort is normal. No respiratory distress.      Breath sounds: Normal breath sounds.   Abdominal:      General: Abdomen is flat.      Palpations: Abdomen is soft.      Tenderness: There is no abdominal tenderness.   Musculoskeletal:         General: No tenderness. Normal range of motion.      Cervical back: Normal range of motion.   Skin:     General: Skin is warm.      Findings: No rash.      Comments: Left axilla lump, firm. Non tender to palpation. See image below.   Neurological:      Mental Status: He is alert and oriented to person, place, and time. Mental status is at baseline.   Psychiatric:         Mood and Affect: Mood normal.         Behavior: Behavior normal.         Laboratory  Lab Results   Component Value Date    WBC 8.01 07/09/2024    HGB 14.9 07/09/2024    HCT 45.8 07/09/2024    MCV 86 07/09/2024     07/09/2024     BMP  Lab Results   Component Value Date     05/12/2023    K 3.7 05/12/2023     05/12/2023    CO2 24 05/12/2023    BUN 9 05/12/2023    CREATININE 1.1 05/12/2023    CALCIUM 9.2 05/12/2023    ANIONGAP 11 05/12/2023    EGFRNORACEVR >60.0 05/12/2023     No results found for: "INR", "PROTIME"  Lab Results   Component Value Date    HGBA1C 5.6 07/09/2024         Health Maintenance Due   Topic Date Due    TETANUS VACCINE  Never done    Shingles Vaccine (1 of 2) Never done    RSV Vaccine (Age 60+ and Pregnant patients) (1 - 1-dose 60+ series) Never done    Pneumococcal Vaccines (Age 65+) (1 of 1 - PCV) Never done    PROSTATE-SPECIFIC ANTIGEN  05/12/2024          HEALTH MAINTENANCE:   Immunizations:   Patient refused immunizations today.    ASSESSMENT & PLAN:   Mr. Bobby Tavarez is a 64 y.o. male w/PMH of HTN, HLD, prediabetes, obesity who presents for annual exam.  -     He is doing " well and I am very pleased with his weight loss progress since starting Semaglutide. Currently on the 1mg dose and tolerating very well. Reports reducing intake of sweets and eating less than normally. Continuing to incorporate physical activity. Feels that his knee arthritis is slightly better also. I discussed calorie counting and avoiding empty calories with him and his wife and encouraged him to keep up the strong work.  - Will check routine bloodwork and PSA.  - His axillar lump seems benign to me. Possibly a cyst or some previous follucilitis that scarred over and led to a nodule formation. Not entirely sure what exactly it is. Non tender and not fluctuant, and has not given him any issues. Will refer to Dermatology for further evaluation and possible removal.    Annual physical exam  -     CBC Auto Differential; Future; Expected date: 07/09/2024  -     Comprehensive Metabolic Panel; Future; Expected date: 07/09/2024  -     Hemoglobin A1C; Future; Expected date: 07/09/2024  -     Lipid Panel; Future; Expected date: 07/09/2024  -     PSA, SCREENING; Future; Expected date: 07/09/2024    Hypertension, unspecified type  -     amLODIPine (NORVASC) 10 MG tablet; Take 1 tablet (10 mg total) by mouth once daily.  Dispense: 90 tablet; Refill: 1    Hyperlipidemia, unspecified hyperlipidemia type  -     atorvastatin (LIPITOR) 40 MG tablet; Take 1 tablet (40 mg total) by mouth once daily.  Dispense: 90 tablet; Refill: 1    Osteoarthritis of both knees, unspecified osteoarthritis type  -     meloxicam (MOBIC) 7.5 MG tablet; Take 1 tablet (7.5 mg total) by mouth once daily.  Dispense: 90 tablet; Refill: 1    Class 3 severe obesity with serious comorbidity and body mass index (BMI) of 40.0 to 44.9 in adult, unspecified obesity type    Sebaceous cyst of left axilla  -     Ambulatory referral/consult to Dermatology; Future; Expected date: 07/16/2024    Abnormal finding of blood chemistry, unspecified  -     CBC Auto  Differential; Future; Expected date: 07/09/2024  -     Hemoglobin A1C; Future; Expected date: 07/09/2024  -     Lipid Panel; Future; Expected date: 07/09/2024    Encounter for screening for malignant neoplasm of prostate  -     PSA, SCREENING; Future; Expected date: 07/09/2024        Return to clinic in 1 year or sooner as needed.    Patient was discussed with Dr. Espinoza.    Jame Jean MD  Internal Medicine, PGY-3  [facility]

## 2024-08-02 ENCOUNTER — TELEPHONE (OUTPATIENT)
Dept: DERMATOLOGY | Facility: CLINIC | Age: 64
End: 2024-08-02
Payer: MEDICARE

## 2024-08-02 NOTE — TELEPHONE ENCOUNTER
----- Message from Steffi Carver sent at 7/30/2024  4:53 PM CDT -----  Consult/Advisory    Name Of CallerMrs Corby :      Contact Preference:[phone]    Nature of call: Ptn wife called to reschedule no appts showing avail please call to assist

## 2024-09-19 DIAGNOSIS — M17.0 OSTEOARTHRITIS OF BOTH KNEES, UNSPECIFIED OSTEOARTHRITIS TYPE: ICD-10-CM

## 2024-09-24 RX ORDER — MELOXICAM 7.5 MG/1
7.5 TABLET ORAL DAILY
Qty: 90 TABLET | Refills: 1 | Status: SHIPPED | OUTPATIENT
Start: 2024-09-24

## 2024-09-27 ENCOUNTER — TELEPHONE (OUTPATIENT)
Dept: ADMINISTRATIVE | Facility: CLINIC | Age: 64
End: 2024-09-27
Payer: MEDICARE

## 2024-09-27 NOTE — TELEPHONE ENCOUNTER
Called pt; no answer; could not confirm appt or leave message due to line kept ringing; I was calling to confirm pt's in office E[facility] appt on 9/30/24.

## 2024-10-24 ENCOUNTER — TELEPHONE (OUTPATIENT)
Dept: OPTOMETRY | Facility: CLINIC | Age: 64
End: 2024-10-24
Payer: MEDICARE

## 2025-02-18 ENCOUNTER — OFFICE VISIT (OUTPATIENT)
Dept: INTERNAL MEDICINE | Facility: CLINIC | Age: 65
End: 2025-02-18
Payer: MEDICARE

## 2025-02-18 VITALS
BODY MASS INDEX: 49.25 KG/M2 | WEIGHT: 306.44 LBS | HEART RATE: 85 BPM | DIASTOLIC BLOOD PRESSURE: 60 MMHG | SYSTOLIC BLOOD PRESSURE: 110 MMHG | OXYGEN SATURATION: 91 % | HEIGHT: 66 IN

## 2025-02-18 DIAGNOSIS — M17.0 PRIMARY OSTEOARTHRITIS OF BOTH KNEES: ICD-10-CM

## 2025-02-18 DIAGNOSIS — M17.0 OSTEOARTHRITIS OF BOTH KNEES, UNSPECIFIED OSTEOARTHRITIS TYPE: ICD-10-CM

## 2025-02-18 DIAGNOSIS — Z78.9 ADVISED ABOUT MANAGEMENT OF WEIGHT: Primary | ICD-10-CM

## 2025-02-18 RX ORDER — MELOXICAM 7.5 MG/1
7.5 TABLET ORAL DAILY
Qty: 90 TABLET | Refills: 1 | Status: SHIPPED | OUTPATIENT
Start: 2025-02-18

## 2025-02-18 NOTE — PATIENT INSTRUCTIONS
You were seen today for a follow-up visit. I will order referrals to Orthopedics for a knee injection and Bariatrics for weight management. If referrals, tests, or imaging were ordered for you, they should be calling you to schedule, but if you do not hear back in a day or two, please call Central Scheduling at [phone] to schedule any appointments. If you have any questions, please reach out through the Patient Portal ([facility]) or call the clinic at [phone] and leave a message for me (Jame Jean).    I would like to see you again in 2 months to follow-up your weight loss efforts.

## 2025-02-18 NOTE — PROGRESS NOTES
[facility]   Clinic Note    Patient Name: Bobby Tavarze   YOB: 1960     SUBJECTIVE:     Chief Complaint: Follow-up to discuss weight    History of Present Illness:  Mr. Bobby Tavarez is a 65 y.o. male with history of HTN, HLD, BMI 49, bilateral knee OA, and prediabetes who presents today for follow-up of weight management. I last saw the patient and his wife on July 9, 2024. He has been doing well since I saw him, but unfortunately, has not lost much weight and in fact, I see that he has actually gained about 20 pounds since July, now up to 305 pounds. They report that he had been on Ozempic briefly but could not tolerate the medication due to its GI side effects, resulting in nausea and constipation, so he stopped it. Since then he has not really made any lasting changes in his diet and lifestyle. Still admits to dietary indiscretions with snacking and fondness for sweet treats. In addition, he reports decreased physical activity due to knee pain, chronic in nature and secondary to his bilateral advanced knee OA. He has been told by Ortho in the past that his weight would be a barrier to total knee replacements. He had steroid injection in the knees back in May 2023 and received great relief afterwards, and was even able to go on a cruise and walked a lot without issues. Has not been back since then. Otherwise, his review of systems is largely unremarkable today.    Review of Systems   Constitutional:  Positive for malaise/fatigue. Negative for chills and fever.   HENT:  Negative for congestion and sore throat.    Eyes:  Negative for double vision and photophobia.   Respiratory:  Negative for cough and shortness of breath.    Cardiovascular:  Negative for chest pain and palpitations.   Gastrointestinal:  Negative for abdominal pain and vomiting.   Genitourinary:  Negative for dysuria and urgency.   Musculoskeletal:  Positive for joint pain. Negative for myalgias and neck pain.    Neurological:  Negative for dizziness and weakness.   Psychiatric/Behavioral:  Negative for depression. The patient is not nervous/anxious.        PAST HISTORY:     Past Medical History:   Diagnosis Date    Essential (primary) hypertension     Hyperlipidemia     Obesity, morbid, BMI 50 or higher     Osteoarthritis of both knees     Prediabetes        Past Surgical History:   Procedure Laterality Date    COLONOSCOPY N/A 10/6/2023    Procedure: COLONOSCOPY;  Surgeon: Edwina Garcia MD;  Location: [facility] (2ND FLR);  Service: Endoscopy;  Laterality: N/A;  10/2 BMI: 48.68  Not taking Ozempic  Referral:  Jame Jean MD  Extended PEG prep  Inst emailed to [email or web address]  LW  10/4-precall complete-Kpvt  10/5-pt notified of floor change-Kpvt    COLONOSCOPY N/A 11/15/2023    Procedure: COLONOSCOPY;  Surgeon: Bj Saleh MD;  Location: [facility] (2ND FLR);  Service: Endoscopy;  Laterality: N/A;  10/20/23-PEG extended prep, instructions via portal, pt is not taking Ozempic-DS  11/9-precall complete-MS    COLONOSCOPY N/A 5/22/2024    Procedure: COLONOSCOPY;  Surgeon: Bj Saleh MD;  Location: [facility] (2ND FLR);  Service: Endoscopy;  Laterality: N/A;  3/22/24: instructions sent via portal. PEG prep-GD   Repeat colonoscopy in 6 months for surveillance.-teodora  5/15-pre call complete-confirmed last dose of ozempic 5/9/24       Family History   Problem Relation Name Age of Onset    Diabetes Mother         Social History     Socioeconomic History    Marital status:    Occupational History    Occupation: , retired   Tobacco Use    Smoking status: Never    Smokeless tobacco: Never   Substance and Sexual Activity    Alcohol use: Never    Drug use: Never       MEDICATIONS & ALLERGIES:     Current Outpatient Medications on File Prior to Visit   Medication Sig    amLODIPine (NORVASC) 10 MG tablet Take 1 tablet (10 mg total) by mouth once daily.    atorvastatin (LIPITOR) 40 MG  "tablet Take 1 tablet (40 mg total) by mouth once daily.    cetirizine (ZYRTEC) 10 MG tablet Take 1 tablet (10 mg total) by mouth once daily.    cholecalciferol, vitamin D3, (VITAMIN D3) 50 mcg (2,000 unit) Cap capsule Vitamin D3 50 mcg (2,000 unit) capsule   Take 1 capsule every day by oral route.    EPINEPHrine (EPIPEN) 0.3 mg/0.3 mL AtIn Inject 0.3 mLs (0.3 mg total) into the muscle once. for 1 dose    famotidine (PEPCID) 20 MG tablet Take 1 tablet (20 mg total) by mouth 2 (two) times daily. for 10 days    LIDOcaine (LIDODERM) 5 % Place 1 patch onto the skin once daily. Remove & Discard patch within 12 hours or as directed by MD    semaglutide (OZEMPIC) 0.25 mg or 0.5 mg (2 mg/3 mL) pen injector Inject 0.25 mg into the skin every 7 days.    SENNA 8.6 mg tablet Take 1 tablet by mouth once daily.    [DISCONTINUED] meloxicam (MOBIC) 7.5 MG tablet Take 1 tablet (7.5 mg total) by mouth once daily.     No current facility-administered medications on file prior to visit.       Review of patient's allergies indicates:  No Known Allergies    OBJECTIVE:     Vital Signs:  Vitals:    02/18/25 1327   BP: 110/60   Pulse: 85   SpO2: (!) 91%   Weight: (!) 139 kg (306 lb 7 oz)   Height: 5' 6" (1.676 m)       Body mass index is 49.46 kg/m².     Physical Exam  Vitals reviewed.   Constitutional:       Appearance: Normal appearance.   HENT:      Head: Normocephalic and atraumatic.   Eyes:      Extraocular Movements: Extraocular movements intact.      Conjunctiva/sclera: Conjunctivae normal.   Pulmonary:      Effort: Pulmonary effort is normal. No respiratory distress.   Abdominal:      General: Abdomen is flat. There is no distension.   Musculoskeletal:         General: Tenderness present.      Cervical back: Normal range of motion.   Skin:     General: Skin is warm.      Findings: No rash.   Neurological:      Mental Status: He is alert and oriented to person, place, and time. Mental status is at baseline.   Psychiatric:         Mood " "and Affect: Mood normal.         Behavior: Behavior normal.         Laboratory  Lab Results   Component Value Date    WBC 8.01 07/09/2024    HGB 14.9 07/09/2024    HCT 45.8 07/09/2024    MCV 86 07/09/2024     07/09/2024     BMP  Lab Results   Component Value Date     07/09/2024    K 4.7 07/09/2024     07/09/2024    CO2 24 07/09/2024    BUN 10 07/09/2024    CREATININE 1.0 07/09/2024    CALCIUM 9.0 07/09/2024    ANIONGAP 9 07/09/2024    EGFRNORACEVR >60.0 07/09/2024     No results found for: "INR", "PROTIME"  Lab Results   Component Value Date    HGBA1C 5.6 07/09/2024         Health Maintenance Due   Topic Date Due    TETANUS VACCINE  Never done    Shingles Vaccine (1 of 2) Never done    Pneumococcal Vaccines (Age 50+) (1 of 1 - PCV) Never done    RSV Vaccine (Age 60+ and Pregnant patients) (1 - Risk 60-74 years 1-dose series) Never done    Influenza Vaccine (1) Never done       ASSESSMENT & PLAN:   Mr. Bobby Tavarez is a 65 y.o. male w/PMH of knee OA, BMI 49, HTN who presents for a follow-up appointment.  -     I advised him on weight management with calorie counting and recommended smartphone apps to help with tracking caloric intake. Discussed with him that his total daily caloric needs is probably somewhere around ~2500 and I counseled on mild weight loss requiring about 300 calories deficits. I recommended that for now, he just track his intake as best as he can. Also counseled on portion control. Will refer him to Bariatric Medicine for discussion of medication therapy which he is amenable to.  - For his knees, I counseled him that I think weight loss is going to help with the pain which in turn will help facilitate him becoming more active. In the meantime, he has not gone for another knee injection in quite some time. I recall he had great relief from the one time he had them done in 2023, so I recommended that he receive another round of injection. He's agreeable so will refer to " Orthopedics.  - Will see in 2 months to follow-up progress on weight management.    Advised about management of weight  -     Ambulatory referral/consult to Bariatric/Obesity Medicine; Future; Expected date: 02/25/2025    Primary osteoarthritis of both knees  -     Ambulatory referral/consult to Orthopedics; Future; Expected date: 02/25/2025    BMI 45.0-49.9, adult  -     Ambulatory referral/consult to Bariatric/Obesity Medicine; Future; Expected date: 02/25/2025    Osteoarthritis of both knees, unspecified osteoarthritis type  -     meloxicam (MOBIC) 7.5 MG tablet; Take 1 tablet (7.5 mg total) by mouth once daily.  Dispense: 90 tablet; Refill: 1        Return to clinic in 2 months for follow-up of weight management or sooner as needed.    Patient was discussed with Dr. Brown.    Jame Jean MD  Internal Medicine, PGY-3  [facility]

## 2025-02-20 NOTE — PROGRESS NOTES
Reviewed documentation of history, physical, assessment, and plan.  The documentation of the history and physical agrees with the residents presentation.  The assessment and plan reflects my discussion with the resident regarding the care and management of this patient

## 2025-02-23 ENCOUNTER — HOSPITAL ENCOUNTER (EMERGENCY)
Facility: HOSPITAL | Age: 65
Discharge: HOME | End: 2025-02-23
Attending: FAMILY MEDICINE
Payer: MEDICARE

## 2025-02-23 VITALS
SYSTOLIC BLOOD PRESSURE: 146 MMHG | WEIGHT: 300 LBS | TEMPERATURE: 99 F | HEIGHT: 66 IN | HEART RATE: 105 BPM | DIASTOLIC BLOOD PRESSURE: 82 MMHG | BODY MASS INDEX: 48.21 KG/M2 | OXYGEN SATURATION: 96 % | RESPIRATION RATE: 15 BRPM

## 2025-02-23 DIAGNOSIS — J20.9 ACUTE BRONCHITIS, UNSPECIFIED ORGANISM: Primary | ICD-10-CM

## 2025-02-23 PROCEDURE — 63600175 PHARM REV CODE 636 W HCPCS: Mod: ER | Performed by: FAMILY MEDICINE

## 2025-02-23 PROCEDURE — 25000003 PHARM REV CODE 250: Mod: ER | Performed by: FAMILY MEDICINE

## 2025-02-23 PROCEDURE — 99283 EMERGENCY DEPT VISIT LOW MDM: CPT | Mod: ER

## 2025-02-23 RX ORDER — PREDNISONE 20 MG/1
60 TABLET ORAL
Status: COMPLETED | OUTPATIENT
Start: 2025-02-23 | End: 2025-02-23

## 2025-02-23 RX ORDER — BENZONATATE 100 MG/1
200 CAPSULE ORAL
Status: COMPLETED | OUTPATIENT
Start: 2025-02-23 | End: 2025-02-23

## 2025-02-23 RX ORDER — BENZONATATE 200 MG/1
200 CAPSULE ORAL 3 TIMES DAILY PRN
Qty: 30 CAPSULE | Refills: 0 | Status: SHIPPED | OUTPATIENT
Start: 2025-02-23 | End: 2025-03-05

## 2025-02-23 RX ADMIN — BENZONATATE 200 MG: 100 CAPSULE ORAL at 02:02

## 2025-02-23 RX ADMIN — PREDNISONE 60 MG: 20 TABLET ORAL at 02:02

## 2025-02-23 NOTE — ED PROVIDER NOTES
Encounter Date: 2/23/2025       History     Chief Complaint   Patient presents with    Cough     Cough x 3 days, green mucus     65-year-old male complains of cough and given color.  No respiratory distress or fever.  No chest pain.    The history is provided by the patient.     Review of patient's allergies indicates:  No Known Allergies  Past Medical History:   Diagnosis Date    Essential (primary) hypertension     Hyperlipidemia     Obesity, morbid, BMI 50 or higher     Osteoarthritis of both knees     Prediabetes      Past Surgical History:   Procedure Laterality Date    COLONOSCOPY N/A 10/6/2023    Procedure: COLONOSCOPY;  Surgeon: Edwina Garcia MD;  Location: [facility] (2ND FLR);  Service: Endoscopy;  Laterality: N/A;  10/2 BMI: 48.68  Not taking Ozempic  Referral:  Jame Jean MD  Extended PEG prep  Inst emailed to [email or web address]  LW  10/4-precall complete-Kpvt  10/5-pt notified of floor change-Kpvt    COLONOSCOPY N/A 11/15/2023    Procedure: COLONOSCOPY;  Surgeon: Bj Saleh MD;  Location: [facility] (2ND FLR);  Service: Endoscopy;  Laterality: N/A;  10/20/23-PEG extended prep, instructions via portal, pt is not taking Ozempic-DS  11/9-precall complete-MS    COLONOSCOPY N/A 5/22/2024    Procedure: COLONOSCOPY;  Surgeon: Bj Saleh MD;  Location: [facility] (2ND FLR);  Service: Endoscopy;  Laterality: N/A;  3/22/24: instructions sent via portal. PEG prep-GD   Repeat colonoscopy in 6 months for surveillance.-teodora  5/15-pre call complete-confirmed last dose of ozempic 5/9/24     Family History   Problem Relation Name Age of Onset    Diabetes Mother       Social History[1]  Review of Systems    Physical Exam     Initial Vitals [02/23/25 1439]   BP Pulse Resp Temp SpO2   (!) 146/82 105 15 98.5 °F (36.9 °C) 96 %      MAP       --         Physical Exam    Nursing note and vitals reviewed.  Constitutional: Vital signs are normal. He appears well-developed and well-nourished. He is active. No  distress.   HENT:   Head: Normocephalic.   Nose: Nose normal. Mouth/Throat: Oropharynx is clear and moist and mucous membranes are normal.   Eyes: Conjunctivae, EOM and lids are normal.   Neck: Neck supple.   Normal range of motion.  Cardiovascular:  Normal rate, regular rhythm, S1 normal, S2 normal and normal heart sounds.           Pulmonary/Chest: Breath sounds normal. No respiratory distress. He has no wheezes. He has no rhonchi. He has no rales. He exhibits no tenderness.   Musculoskeletal:      Right upper arm: Normal.      Left upper arm: Normal.      Cervical back: Normal range of motion and neck supple.      Right lower leg: Normal.      Left lower leg: Normal.     Neurological: He is alert and oriented to person, place, and time. He has normal strength. GCS score is 15. GCS eye subscore is 4. GCS verbal subscore is 5. GCS motor subscore is 6.   Skin: Skin is warm. Capillary refill takes less than 2 seconds.   Psychiatric: He has a normal mood and affect. His speech is normal and behavior is normal. Thought content normal. Cognition and memory are normal.         ED Course   Procedures  Labs Reviewed - No data to display       Imaging Results    None          Medications   benzonatate capsule 200 mg (has no administration in time range)   predniSONE tablet 60 mg (has no administration in time range)     Medical Decision Making  URI, bronchitis, pneumonia, CHF  Good air entry and expansion of lungs and normal saturations.  No respiratory distress.  Bronchitis will treat with prednisone, Tessalon in ED along with prescription for Tessalon and Claritin D.  Follow up PCP/ED with any worsening symptoms.    Risk  OTC drugs.  Prescription drug management.                                      Clinical Impression:  Final diagnoses:  [J20.9] Acute bronchitis, unspecified organism (Primary)          ED Disposition Condition    Discharge Stable          ED Prescriptions       Medication Sig Dispense Start Date End Date  Auth. Provider    benzonatate (TESSALON) 200 MG capsule Take 1 capsule (200 mg total) by mouth 3 (three) times daily as needed. 30 capsule 2/23/2025 3/5/2025 Jun Short MD    loratadine-pseudoephedrine  mg (CLARITIN-D 24-HOUR)  mg per 24 hr tablet Take 1 tablet by mouth once daily. for 10 days -- 2/23/2025 3/5/2025 Jun Short MD          Follow-up Information       Follow up With Specialties Details Why Contact Info    Jame Jean MD Internal Medicine   [address] [place] [id number]  [phone]                 [1]   Social History  Tobacco Use    Smoking status: Never    Smokeless tobacco: Never   Substance Use Topics    Alcohol use: Never    Drug use: Never        Jun Short MD  02/23/25 9146

## 2025-03-11 ENCOUNTER — OFFICE VISIT (OUTPATIENT)
Dept: OPTOMETRY | Facility: CLINIC | Age: 65
End: 2025-03-11
Payer: COMMERCIAL

## 2025-03-11 DIAGNOSIS — H11.133 CONJUNCTIVAL PIGMENTATION, BILATERAL: ICD-10-CM

## 2025-03-11 DIAGNOSIS — I10 PRIMARY HYPERTENSION: ICD-10-CM

## 2025-03-11 DIAGNOSIS — H25.13 NS (NUCLEAR SCLEROSIS), BILATERAL: ICD-10-CM

## 2025-03-11 DIAGNOSIS — H11.153 PINGUECULA OF BOTH EYES: ICD-10-CM

## 2025-03-11 DIAGNOSIS — H52.4 PRESBYOPIA: ICD-10-CM

## 2025-03-11 DIAGNOSIS — H40.013 OPEN ANGLE WITH BORDERLINE FINDINGS AND LOW GLAUCOMA RISK IN BOTH EYES: Primary | ICD-10-CM

## 2025-03-11 PROCEDURE — 92015 DETERMINE REFRACTIVE STATE: CPT | Mod: S$GLB,,, | Performed by: OPTOMETRIST

## 2025-03-11 PROCEDURE — 92004 COMPRE OPH EXAM NEW PT 1/>: CPT | Mod: S$GLB,,, | Performed by: OPTOMETRIST

## 2025-03-11 PROCEDURE — 99999 PR PBB SHADOW E&M-EST. PATIENT-LVL III: CPT | Mod: PBBFAC,,, | Performed by: OPTOMETRIST

## 2025-03-11 PROCEDURE — 92133 CPTRZD OPH DX IMG PST SGM ON: CPT | Mod: S$GLB,,, | Performed by: OPTOMETRIST

## 2025-03-11 NOTE — PROGRESS NOTES
HPI    Here for annual eye exam     Eye meds: None    65 year old male states his last eye exam was 2 years ago. Denies flashes   and diplopia, but occasionally sees floaters OU. Denies itchy or scratchy   lids. Denies ocular pain. States he would like no line bifocal. Voices no   concerns   Last edited by Tia Gómez on 3/11/2025  9:38 AM.            Assessment /Plan     For exam results, see Encounter Report.    Open angle with borderline findings and low glaucoma risk in both eyes  IOP WNL  Cupping OS>OD  -  Today   Posterior Segment OCT Optic Nerve- Both eyes: WNL OU    Primary hypertension   No retinopathy, monitor yearly    NS (nuclear sclerosis), bilateral   Mild, monitor yearly    Pinguecula of both eyes  Conjunctival pigmentation, bilateral   Monitor     Presbyopia   Rx specs         RTC 1 year

## 2025-03-25 ENCOUNTER — TELEPHONE (OUTPATIENT)
Dept: BARIATRICS | Facility: CLINIC | Age: 65
End: 2025-03-25
Payer: MEDICARE

## 2025-03-27 ENCOUNTER — TELEPHONE (OUTPATIENT)
Dept: BARIATRICS | Facility: CLINIC | Age: 65
End: 2025-03-27
Payer: MEDICARE

## 2025-03-27 NOTE — TELEPHONE ENCOUNTER
----- Message from Leon sent at 3/25/2025 12:33 PM CDT -----  Regarding: called to schedule medical financial consult. lpm/unable to lvm. 1st attempt  called to schedule medical financial consult. lpm/unable to lvm. 1st attempt

## 2025-04-04 ENCOUNTER — TELEPHONE (OUTPATIENT)
Dept: INTERNAL MEDICINE | Facility: CLINIC | Age: 65
End: 2025-04-04
Payer: MEDICARE

## 2025-04-04 NOTE — TELEPHONE ENCOUNTER
----- Message from Leon sent at 4/4/2025  9:28 AM CDT -----  Regarding: Referral  Good morning,I made several attempts to contact the patient to schedule with Bariatrics. Mr. Tavarez did not return any of my calls.Thanks,Leon MartinezAccess Navigator Bariatrics

## 2025-05-08 ENCOUNTER — HOSPITAL ENCOUNTER (EMERGENCY)
Facility: HOSPITAL | Age: 65
Discharge: HOME | End: 2025-05-08
Payer: MEDICARE

## 2025-05-08 VITALS
SYSTOLIC BLOOD PRESSURE: 131 MMHG | HEIGHT: 66 IN | RESPIRATION RATE: 18 BRPM | WEIGHT: 300 LBS | BODY MASS INDEX: 48.21 KG/M2 | OXYGEN SATURATION: 97 % | TEMPERATURE: 99 F | DIASTOLIC BLOOD PRESSURE: 91 MMHG | HEART RATE: 97 BPM

## 2025-05-08 DIAGNOSIS — M16.12 ARTHRITIS OF LEFT HIP: ICD-10-CM

## 2025-05-08 DIAGNOSIS — R10.32 LEFT GROIN PAIN: Primary | ICD-10-CM

## 2025-05-08 DIAGNOSIS — M25.552 ACUTE HIP PAIN, LEFT: ICD-10-CM

## 2025-05-08 PROCEDURE — 99283 EMERGENCY DEPT VISIT LOW MDM: CPT | Mod: 25,ER

## 2025-05-08 PROCEDURE — 25000003 PHARM REV CODE 250: Mod: ER | Performed by: PHYSICIAN ASSISTANT

## 2025-05-08 RX ORDER — METHOCARBAMOL 500 MG/1
1000 TABLET, FILM COATED ORAL
Status: COMPLETED | OUTPATIENT
Start: 2025-05-08 | End: 2025-05-08

## 2025-05-08 RX ORDER — METHOCARBAMOL 500 MG/1
500 TABLET, FILM COATED ORAL 2 TIMES DAILY PRN
Qty: 10 TABLET | Refills: 0 | Status: SHIPPED | OUTPATIENT
Start: 2025-05-08 | End: 2025-05-13

## 2025-05-08 RX ADMIN — METHOCARBAMOL 1000 MG: 500 TABLET ORAL at 06:05

## 2025-05-08 NOTE — ED PROVIDER NOTES
Encounter Date: 5/8/2025       History     Chief Complaint   Patient presents with    Groin Pain     Patient presents to ed with left sided groin pain x 5 days; denies any trauma or injuries. Denies any scrotal swelling.      65-year-old male presenting to emergency department with complaints of left groin muscle strain over last 1 day.  Patient's wife at bedside reports due to a new bed which is higher patient is having to adjust getting into bed and believes this is how he injured himself.  Patient denies any testicle pain, mass, bulge, swelling, decreased urination or any difficulty urinating.  No bladder/bowel dysfunction or saddle paresthesias.  Patient reports he is ambulatory despite discomfort.  No alleviating factors noted.  No other physical complaints this time.    The history is provided by the patient. No  was used.     Review of patient's allergies indicates:  No Known Allergies  Past Medical History:   Diagnosis Date    Essential (primary) hypertension     Hyperlipidemia     Obesity, morbid, BMI 50 or higher     Osteoarthritis of both knees     Prediabetes      Past Surgical History:   Procedure Laterality Date    COLONOSCOPY N/A 10/6/2023    Procedure: COLONOSCOPY;  Surgeon: Edwina Garcia MD;  Location: [facility] ([address] [place] [address]);  Service: Endoscopy;  Laterality: N/A;  10/2 BMI: 48.68  Not taking Ozempic  Referral:  Jame Jean MD  Extended PEG prep  Inst emailed to [email or web address]  LW  10/4-precall complete-Kpvt  10/5-pt notified of floor change-Kpvt    COLONOSCOPY N/A 11/15/2023    Procedure: COLONOSCOPY;  Surgeon: Bj Saleh MD;  [facility]  Service: Endoscopy;  Laterality: N/A;  10/20/23-PEG extended prep, instructions via portal, pt is not taking Ozempic-DS  11/9-precall complete-MS    COLONOSCOPY N/A 5/22/2024    Procedure: COLONOSCOPY;  Surgeon: Bj Saleh MD;  Location: [facility] ([address] [place] [address]);  Service: Endoscopy;  Laterality: N/A;   3/22/24: instructions sent via portal. PEG prep-GD   Repeat colonoscopy in 6 months for surveillance.-araiza  5/15-pre call complete-confirmed last dose of ozempic 5/9/24     Family History   Problem Relation Name Age of Onset    Diabetes Mother       Social History[1]  Review of Systems   Constitutional:  Negative for chills, diaphoresis, fatigue and fever.   HENT:  Negative for congestion, ear pain, sinus pain, sore throat and trouble swallowing.    Eyes:  Negative for photophobia, pain, redness and visual disturbance.   Respiratory:  Negative for cough, choking, chest tightness, shortness of breath, wheezing and stridor.    Cardiovascular:  Negative for chest pain, palpitations and leg swelling.   Gastrointestinal:  Negative for abdominal pain, diarrhea, nausea and vomiting.   Endocrine: Negative.    Genitourinary:  Negative for decreased urine volume, dysuria, flank pain, hematuria and urgency.   Musculoskeletal:  Positive for myalgias. Negative for back pain and neck pain.   Skin: Negative.    Allergic/Immunologic: Negative.    Neurological:  Negative for dizziness, weakness and headaches.   Hematological: Negative.    Psychiatric/Behavioral: Negative.     All other systems reviewed and are negative.      Physical Exam     Initial Vitals [05/08/25 1806]   BP Pulse Resp Temp SpO2   (!) 131/91 97 18 98.5 °F (36.9 °C) 97 %      MAP       --         Physical Exam    Nursing note and vitals reviewed.  Constitutional: Vital signs are normal. He appears well-developed and well-nourished. He is not diaphoretic. No distress.   Obese 65-year-old male lying in bed in no acute distress, nontoxic appearance, breathing comfortably on room air, clinically well-appearing   HENT:   Head: Normocephalic and atraumatic.   Right Ear: Hearing and external ear normal.   Left Ear: Hearing and external ear normal.   Nose: Nose normal. Mouth/Throat: Oropharynx is clear and moist.   Eyes: Conjunctivae and EOM are normal. Pupils are equal,  round, and reactive to light.   Neck: Trachea normal. Neck supple.   Normal range of motion.  Cardiovascular:  Normal rate, regular rhythm and normal pulses.           Pulmonary/Chest: Effort normal. No accessory muscle usage. No tachypnea. No respiratory distress.   Abdominal:   Left groin crease musculature generally tender.  Patient denying any scrotal/testicular tenderness.  No palpable masses or lumps.  Left anterior proximal thigh musculature increased discomfort with ranging the left hip   Musculoskeletal:         General: Tenderness present. No edema. Normal range of motion.      Cervical back: Normal range of motion and neck supple.     Neurological: He is alert and oriented to person, place, and time. He has normal strength. He displays no tremor. No sensory deficit. He exhibits normal muscle tone. He displays no seizure activity. Coordination normal. GCS score is 15. GCS eye subscore is 4. GCS verbal subscore is 5. GCS motor subscore is 6.   Skin: Skin is warm and dry. Capillary refill takes less than 2 seconds.         ED Course   Procedures  Labs Reviewed - No data to display       Imaging Results              X-Ray Hip 2 or 3 views Left with Pelvis when performed (Final result)  Result time 05/08/25 19:06:59      Final result by Benito ParraBret), MD (05/08/25 19:06:59)                   Impression:     Moderate arthritic changes of the left hip.  No acute bony changes.    Finalized on: 5/8/2025 7:06 PM By:  Aguila Parra MD  [facility]# [id number]      2025-05-08 19:09:07.860     [facility]               Narrative:      EXAM:  XR HIP WITH PELVIS WHEN PERFORMED 2 OR 3 VIEWS LEFT    CLINICAL HISTORY:  Left hip pain    FINDINGS: 3 views.  Moderate joint space narrowing.  No acute fracture or dislocation. No acute bony abnormality.                                         Medications   methocarbamoL tablet 1,000 mg (1,000 mg Oral Given 5/8/25 6376)     Medical Decision Making  Discussed care plan with patient and  his wife at bedside.  Clinical exam correlating to left groin strain although left hip discomfort with ranging.  X-ray imaging of left hip showing arthritis although otherwise stable.  No testicle/scrotal/penile discomfort.  Patient educated on symptomatic management for musculoskeletal strain, close PCP follow-up as well as ED return precautions.  Patient is stable, all questions answered and ready for discharge.    Amount and/or Complexity of Data Reviewed  Radiology: ordered.    Risk  Prescription drug management.                                      Clinical Impression:  Final diagnoses:  [M25.552] Acute hip pain, left  [R10.32] Left groin pain (Primary)  [M16.12] Arthritis of left hip          ED Disposition Condition    Discharge Stable          ED Prescriptions       Medication Sig Dispense Start Date End Date Auth. Provider    methocarbamoL (ROBAXIN) 500 MG Tab Take 1 tablet (500 mg total) by mouth 2 (two) times daily as needed (muscle discomfort). 10 tablet 5/8/2025 5/13/2025 Juanita Schuler PA-C          Follow-up Information       Follow up With Specialties Details Why Contact Info    Jame Jean MD Internal Medicine Schedule an appointment as soon as possible for a visit in 2 days If symptoms worsen [address]  [place] [address]  [phone]      [facility] - Emergency Dept Emergency Medicine Go to  If symptoms worsen [address] [facility]  Emergency Department  [facility] [address] [phone]          PATIENT SEEN BY MIRNA ONLY.       [1]   Social History  Tobacco Use    Smoking status: Never    Smokeless tobacco: Never   Substance Use Topics    Alcohol use: Never    Drug use: Never        Juanita Schuler PA-C  05/08/25 1096

## 2025-05-12 ENCOUNTER — OFFICE VISIT (OUTPATIENT)
Dept: INTERNAL MEDICINE | Facility: CLINIC | Age: 65
End: 2025-05-12
Payer: MEDICARE

## 2025-05-12 VITALS
BODY MASS INDEX: 48.76 KG/M2 | DIASTOLIC BLOOD PRESSURE: 80 MMHG | SYSTOLIC BLOOD PRESSURE: 110 MMHG | HEIGHT: 66 IN | WEIGHT: 303.38 LBS

## 2025-05-12 DIAGNOSIS — Z09 FOLLOW-UP EXAM: Primary | ICD-10-CM

## 2025-05-12 DIAGNOSIS — I10 HYPERTENSION, UNSPECIFIED TYPE: ICD-10-CM

## 2025-05-12 DIAGNOSIS — M17.0 OSTEOARTHRITIS OF BOTH KNEES, UNSPECIFIED OSTEOARTHRITIS TYPE: ICD-10-CM

## 2025-05-12 DIAGNOSIS — E66.01 CLASS 3 SEVERE OBESITY WITH SERIOUS COMORBIDITY AND BODY MASS INDEX (BMI) OF 40.0 TO 44.9 IN ADULT, UNSPECIFIED OBESITY TYPE: ICD-10-CM

## 2025-05-12 DIAGNOSIS — S76.212A STRAIN OF LEFT GROIN: ICD-10-CM

## 2025-05-12 DIAGNOSIS — E66.813 CLASS 3 SEVERE OBESITY WITH SERIOUS COMORBIDITY AND BODY MASS INDEX (BMI) OF 40.0 TO 44.9 IN ADULT, UNSPECIFIED OBESITY TYPE: ICD-10-CM

## 2025-05-12 PROCEDURE — 99999 PR PBB SHADOW E&M-EST. PATIENT-LVL III: CPT | Mod: PBBFAC,GC,,

## 2025-05-12 RX ORDER — LIDOCAINE 50 MG/G
1 PATCH TOPICAL DAILY
Qty: 5 PATCH | Refills: 0 | Status: SHIPPED | OUTPATIENT
Start: 2025-05-12 | End: 2025-05-17

## 2025-05-12 RX ORDER — AMLODIPINE BESYLATE 10 MG/1
10 TABLET ORAL DAILY
Qty: 90 TABLET | Refills: 1 | Status: SHIPPED | OUTPATIENT
Start: 2025-05-12

## 2025-05-12 RX ORDER — IBUPROFEN 800 MG/1
800 TABLET, FILM COATED ORAL 3 TIMES DAILY
Qty: 15 TABLET | Refills: 0 | Status: SHIPPED | OUTPATIENT
Start: 2025-05-12

## 2025-05-12 NOTE — PATIENT INSTRUCTIONS
You were seen today for a follow-up visit for weight loss and your recent ER visit for the left groin strain. For the groin pain, I believe this will improve with more time. In the meantime try your best to take it easy for the next 3-5 days and avoid aggravating the injury. I would like for you to take Ibuprofen 800mg three times daily for the next 5 days. Please DO NOT take your Mobic as scheduled (7.5mg daily) while you are taking Ibuprofen. You may resume the mobic after you finish the ibuprofen. Please continue to use heat on the area as needed. I will also order some lidocaine patches for you to place in that spot. Let me know if your pain has not improved in 5 days with these measures.    I will re-order referrals to Orthopedics for a knee injection and Bariatrics for weight management. If referrals, tests, or imaging were ordered for you, they should be calling you to schedule, but if you do not hear back in a day or two, please call Central Scheduling at [phone] to schedule any appointments. If you have any questions, please reach out through the Patient Portal ([facility]) or call the clinic at [phone] and leave a message for me (Jame Jean). It has been a pleasure to take care of you Mr. Tavarez and I will miss seeing you and your wife! Let me know if you are ever in the [facility] area!    I will be scheduling you to see Dr. Deena Tenorio next time, and would like you to follow-up with us in about 3 months' time.

## 2025-05-12 NOTE — PROGRESS NOTES
[facility]   Clinic Note    Patient Name: Bobby Tavarez   YOB: 1960     SUBJECTIVE:     Chief Complaint:    History of Present Illness:  Mr. Bobby Tavarez is a 65 y.o. male with history of bilateral knee OA, HTN, BMI 48, and prediabetes who presents for follow-up for weight loss and recent ER visit for left groin pain.    Last visit with me on 2/18/25 primarily to discuss weight loss. Ordered referral to bariatrics at last visit, but he was not able to establish with them due to never calling their office back to schedule an appointment. At our last visit, we discussed tracking calories and trying to implement portion control. He has not yet attempted to count his daily caloric intake. Last weight was 136.1 kg and BMI 48. Today's weight: 137.6kg. He reports he has been eating mainly salads now and has cut out almost entirely sweet snacks since I last saw him.    He had a recent ER visit on 5/8/25 for left groin pain after recently getting a new bed at home and having difficulties with entering/exiting bed due to the height. X-ray without any acute abnormalities seen, and he was discharged with 10 tabs of Robaxin 500. He says that the muscle relaxant really has not helped at all. He is using heat on the affected area, and continues to take his daily Mobic 7.5mg, but has not experienced much relief.    For his knee OA, he has still been having on and off bad knee pain that I had referred to orthopedics for knee injections, but he was not able to make his appointment. He is interested in re-scheduling an appointment for knee injections.    ROS otherwise unremarkable.    PAST HISTORY:     Past Medical History:   Diagnosis Date    Essential (primary) hypertension     Hyperlipidemia     Obesity, morbid, BMI 50 or higher     Osteoarthritis of both knees     Prediabetes        Past Surgical History:   Procedure Laterality Date    COLONOSCOPY N/A 10/6/2023    Procedure: COLONOSCOPY;   Surgeon: Edwina Garcia MD;  Location: [facility] (2ND FLR);  Service: Endoscopy;  Laterality: N/A;  10/2 BMI: 48.68  Not taking Ozempic  Referral:  Jame Jean MD  Extended PEG prep  Inst emailed to [email or web address]  LW  10/4-precall complete-Kpvt  10/5-pt notified of floor change-Kpvt    COLONOSCOPY N/A 11/15/2023    Procedure: COLONOSCOPY;  Surgeon: Bj Saleh MD;  Location: [facility] (2ND FLR);  Service: Endoscopy;  Laterality: N/A;  10/20/23-PEG extended prep, instructions via portal, pt is not taking Ozempic-DS  11/9-precall complete-MS    COLONOSCOPY N/A 5/22/2024    Procedure: COLONOSCOPY;  Surgeon: Bj Saleh MD;  Location: [facility] (2ND FLR);  Service: Endoscopy;  Laterality: N/A;  3/22/24: instructions sent via portal. PEG prep-GD   Repeat colonoscopy in 6 months for surveillance.-teodora  5/15-pre call complete-confirmed last dose of ozempic 5/9/24       Family History   Problem Relation Name Age of Onset    Diabetes Mother         Social History     Socioeconomic History    Marital status:    Occupational History    Occupation: , retired   Tobacco Use    Smoking status: Never    Smokeless tobacco: Never   Substance and Sexual Activity    Alcohol use: Never    Drug use: Never       MEDICATIONS & ALLERGIES:     Current Outpatient Medications on File Prior to Visit   Medication Sig    atorvastatin (LIPITOR) 40 MG tablet Take 1 tablet (40 mg total) by mouth once daily.    cetirizine (ZYRTEC) 10 MG tablet Take 1 tablet (10 mg total) by mouth once daily.    cholecalciferol, vitamin D3, (VITAMIN D3) 50 mcg (2,000 unit) Cap capsule Vitamin D3 50 mcg (2,000 unit) capsule   Take 1 capsule every day by oral route.    EPINEPHrine (EPIPEN) 0.3 mg/0.3 mL AtIn Inject 0.3 mLs (0.3 mg total) into the muscle once. for 1 dose    famotidine (PEPCID) 20 MG tablet Take 1 tablet (20 mg total) by mouth 2 (two) times daily. for 10 days    meloxicam (MOBIC) 7.5 MG tablet Take 1  "tablet (7.5 mg total) by mouth once daily.    methocarbamoL (ROBAXIN) 500 MG Tab Take 1 tablet (500 mg total) by mouth 2 (two) times daily as needed (muscle discomfort).    SENNA 8.6 mg tablet Take 1 tablet by mouth once daily.    [DISCONTINUED] amLODIPine (NORVASC) 10 MG tablet Take 1 tablet (10 mg total) by mouth once daily.    [DISCONTINUED] LIDOcaine (LIDODERM) 5 % Place 1 patch onto the skin once daily. Remove & Discard patch within 12 hours or as directed by MD    [DISCONTINUED] semaglutide (OZEMPIC) 0.25 mg or 0.5 mg (2 mg/3 mL) pen injector Inject 0.25 mg into the skin every 7 days.     No current facility-administered medications on file prior to visit.       Review of patient's allergies indicates:  No Known Allergies    OBJECTIVE:     Vital Signs:  Vitals:    05/12/25 1256   BP: 110/80   BP Location: Right arm   Weight: (!) 137.6 kg (303 lb 5.7 oz)   Height: 5' 6" (1.676 m)       Body mass index is 48.96 kg/m².     Physical Exam  Vitals reviewed.   Constitutional:       Appearance: Normal appearance.   HENT:      Head: Normocephalic and atraumatic.   Eyes:      Extraocular Movements: Extraocular movements intact.      Conjunctiva/sclera: Conjunctivae normal.   Pulmonary:      Effort: Pulmonary effort is normal. No respiratory distress.   Musculoskeletal:         General: Tenderness (Left groin tenderness. bilateral knee tenderness) present. Normal range of motion.      Cervical back: Normal range of motion.   Skin:     General: Skin is warm.      Findings: No rash.   Neurological:      Mental Status: He is alert and oriented to person, place, and time. Mental status is at baseline.   Psychiatric:         Mood and Affect: Mood normal.         Behavior: Behavior normal.         Laboratory  Lab Results   Component Value Date    WBC 8.01 07/09/2024    HGB 14.9 07/09/2024    HCT 45.8 07/09/2024    MCV 86 07/09/2024     07/09/2024     BMP  Lab Results   Component Value Date     07/09/2024    K 4.7 " "07/09/2024     07/09/2024    CO2 24 07/09/2024    BUN 10 07/09/2024    CREATININE 1.0 07/09/2024    CALCIUM 9.0 07/09/2024    ANIONGAP 9 07/09/2024    EGFRNORACEVR >60.0 07/09/2024     No results found for: "INR", "PROTIME"  Lab Results   Component Value Date    HGBA1C 5.6 07/09/2024         Health Maintenance Due   Topic Date Due    TETANUS VACCINE  Never done    Shingles Vaccine (1 of 2) Never done    Pneumococcal Vaccines (Age 50+) (1 of 1 - PCV) Never done    RSV Vaccine (Age 60+ and Pregnant patients) (1 - Risk 60-74 years 1-dose series) Never done       ASSESSMENT & PLAN:   Mr. Bobby Tavarez is a 65 y.o. male w/PMH of OA, prediabetes, HTN, and BMI 48 who presents for follow-up exam.  -     Doing OK today, having some trouble with walking and usual physical activities due to his left groin sprain from 5/8. Still is giving a decent amount of issues due to pain, so will switch his Mobic to Ibuprofen 800mg TID for 5 days for now, and have him hold the Mobic until he finishes. Advised to rest for now and continue heat or ice on the affected area. Will order lidocaine patches for additional relief if needed.  - Unfortunately no progress made with weight loss, though he has made good progress with his diet. I do suspect he may be using too much ranch salad dressing despite his primarily salad based diet since his weight is essentially unchanged from 2-3 months ago. Will re order referral to Bariatrics.  - Re-ordered referral to ortho for knee injections.    Follow-up exam    Class 3 severe obesity with serious comorbidity and body mass index (BMI) of 40.0 to 44.9 in adult, unspecified obesity type  -     Ambulatory referral/consult to Bariatric/Obesity Medicine; Future; Expected date: 05/19/2025    Osteoarthritis of both knees, unspecified osteoarthritis type  -     Ambulatory referral/consult to Orthopedics; Future; Expected date: 05/19/2025    Hypertension, unspecified type  -     amLODIPine (NORVASC) 10 MG " tablet; Take 1 tablet (10 mg total) by mouth once daily.  Dispense: 90 tablet; Refill: 1    Strain of left groin  -     ibuprofen (ADVIL,MOTRIN) 800 MG tablet; Take 1 tablet (800 mg total) by mouth 3 (three) times daily.  Dispense: 15 tablet; Refill: 0  -     LIDOcaine (LIDODERM) 5 %; Place 1 patch onto the skin once daily. Remove & Discard patch within 12 hours. for 5 days  Dispense: 5 patch; Refill: 0        Return to clinic in 3-4 months to follow up weight loss or sooner as needed.    Patient was discussed with Dr. Barrera.    Jame Jean MD  Internal Medicine, PGY-3  [facility]

## 2025-05-13 ENCOUNTER — HOSPITAL ENCOUNTER (OUTPATIENT)
Dept: RADIOLOGY | Facility: HOSPITAL | Age: 65
Discharge: HOME | End: 2025-05-13
Attending: NURSE PRACTITIONER
Payer: MEDICARE

## 2025-05-13 ENCOUNTER — OFFICE VISIT (OUTPATIENT)
Dept: ORTHOPEDICS | Facility: CLINIC | Age: 65
End: 2025-05-13
Payer: MEDICARE

## 2025-05-13 VITALS — WEIGHT: 304.25 LBS | HEIGHT: 65 IN | BODY MASS INDEX: 50.69 KG/M2

## 2025-05-13 DIAGNOSIS — M17.0 OSTEOARTHRITIS OF BOTH KNEES, UNSPECIFIED OSTEOARTHRITIS TYPE: ICD-10-CM

## 2025-05-13 DIAGNOSIS — M17.0 PRIMARY OSTEOARTHRITIS OF BOTH KNEES: Primary | ICD-10-CM

## 2025-05-13 DIAGNOSIS — M17.0 OSTEOARTHRITIS OF BOTH KNEES, UNSPECIFIED OSTEOARTHRITIS TYPE: Primary | ICD-10-CM

## 2025-05-13 PROCEDURE — 1160F RVW MEDS BY RX/DR IN RCRD: CPT | Mod: CPTII,S$GLB,, | Performed by: NURSE PRACTITIONER

## 2025-05-13 PROCEDURE — 1101F PT FALLS ASSESS-DOCD LE1/YR: CPT | Mod: CPTII,S$GLB,, | Performed by: NURSE PRACTITIONER

## 2025-05-13 PROCEDURE — 73562 X-RAY EXAM OF KNEE 3: CPT | Mod: TC,50

## 2025-05-13 PROCEDURE — 99214 OFFICE O/P EST MOD 30 MIN: CPT | Mod: 25,S$GLB,, | Performed by: NURSE PRACTITIONER

## 2025-05-13 PROCEDURE — 99999 PR PBB SHADOW E&M-EST. PATIENT-LVL III: CPT | Mod: PBBFAC,,, | Performed by: NURSE PRACTITIONER

## 2025-05-13 PROCEDURE — 3008F BODY MASS INDEX DOCD: CPT | Mod: CPTII,S$GLB,, | Performed by: NURSE PRACTITIONER

## 2025-05-13 PROCEDURE — 73562 X-RAY EXAM OF KNEE 3: CPT | Mod: 26,50,, | Performed by: RADIOLOGY

## 2025-05-13 PROCEDURE — 1159F MED LIST DOCD IN RCRD: CPT | Mod: CPTII,S$GLB,, | Performed by: NURSE PRACTITIONER

## 2025-05-13 PROCEDURE — 3288F FALL RISK ASSESSMENT DOCD: CPT | Mod: CPTII,S$GLB,, | Performed by: NURSE PRACTITIONER

## 2025-05-13 PROCEDURE — 1125F AMNT PAIN NOTED PAIN PRSNT: CPT | Mod: CPTII,S$GLB,, | Performed by: NURSE PRACTITIONER

## 2025-05-13 PROCEDURE — 20610 DRAIN/INJ JOINT/BURSA W/O US: CPT | Mod: 50,S$GLB,, | Performed by: NURSE PRACTITIONER

## 2025-05-13 RX ORDER — TRIAMCINOLONE ACETONIDE 40 MG/ML
80 INJECTION, SUSPENSION INTRA-ARTICULAR; INTRAMUSCULAR
Status: COMPLETED | OUTPATIENT
Start: 2025-05-13 | End: 2025-05-13

## 2025-05-13 RX ADMIN — TRIAMCINOLONE ACETONIDE 80 MG: 40 INJECTION, SUSPENSION INTRA-ARTICULAR; INTRAMUSCULAR at 05:05

## 2025-05-13 NOTE — PROGRESS NOTES
CHIEF COMPLAINT:  - Bilateral knee pain    HPI:  Bobby presents for evaluation of bilateral knee pain ongoing for years. Both knees hurt equally, with pain worse medially. Pain severity is significant, with meloxicam providing minimal relief at this point. He has received injections from Lorrie Young, in the past, which were helpful. XRs reveal significant cartilage loss, with bone-on-bone contact visible. His current weight is 334 lbs, and he needs to lose approximately 50 lbs to reach a BMI of 45 for optimal surgical outcomes. He also mentions a recent muscle pull in his groin while getting into bed which makes lifting the left leg difficult. He was seen for that in the ER.    PREVIOUS TREATMENTS:  - bilateral knee CSI with good relief    IMAGING:  - XR Bilateral Knees: Significant cartilage loss, bone-on-bone contact visible, particularly on the inside of the knees. Poor ligament condition, causing instability in the knees.    MEDICATIONS:  - Meloxicam: Minimal pain relief    WORK STATUS:  - Retired  - Previously worked as a  for 36 years    ROS  General: denies fever and chills  Resp: no c/o sob  CVS: no c/o cp  MSK: +joint pain, +limb pain, +muscle pain    PE  General: AAOx3, pleasant and cooperative  Resp: respirations even and unlabored  MSK: bilateral knee exam  0 degrees extension  125 degrees flexion  No warmth or erythema   - effusion  5/5 quad and hamstring strength  + pain over the medial joint line     Xray:  Personally interpreted by me and reviewed with the patient: severe tricompartmental degenerative changes with medial joint space loss    Assessment:  Bilateral knee djd    Plan:  Cortisone injections bilateral knees today for pain relief  Bariatric medicine consult for weight loss to get BMI less than 45  Continue mobic as needed    Knee Injection Procedure Note  Diagnosis: bilateral knee degenerative arthritis  Indications: bilateral knee pain  Procedure Details: Verbal  consent was obtained for the procedure. The injection site was identified and the skin was prepared with alcohol. The bilateral knee was injected from an anterolateral approach with 1 ml of Kenalog and 4 ml Lidocaine each under sterile technique using a 25 gauge needle. The needle was removed and the area cleansed and dressed.  Complications:  Patient tolerated the procedure well.    he was advised to rest the knee today, using ice and elevation as needed for comfort and swelling.Immediate relief of the knee pain may be short lived and secondary to the lidocaine. he may have an increase in discomfort tonight followed by steady improvement over the next several days. It may take 1-2 weeks following the injection to get the full benefit of the medication.        This note was generated with the assistance of ambient listening technology. Verbal consent was obtained by the patient and accompanying visitor(s) for the recording of patient appointment to facilitate this note. I attest to having reviewed and edited the generated note for accuracy, though some syntax or spelling errors may persist. Please contact the author of this note for any clarification.

## 2025-06-02 ENCOUNTER — TELEPHONE (OUTPATIENT)
Dept: INTERNAL MEDICINE | Facility: CLINIC | Age: 65
End: 2025-06-02
Payer: MEDICARE

## 2025-06-03 ENCOUNTER — OFFICE VISIT (OUTPATIENT)
Dept: INTERNAL MEDICINE | Facility: CLINIC | Age: 65
End: 2025-06-03
Payer: MEDICARE

## 2025-06-03 ENCOUNTER — HOSPITAL ENCOUNTER (OUTPATIENT)
Dept: RADIOLOGY | Facility: HOSPITAL | Age: 65
Discharge: HOME | End: 2025-06-03
Payer: MEDICARE

## 2025-06-03 DIAGNOSIS — R20.2 LEFT LEG PARESTHESIAS: Primary | ICD-10-CM

## 2025-06-03 DIAGNOSIS — R10.32 LEFT INGUINAL PAIN: ICD-10-CM

## 2025-06-03 DIAGNOSIS — R20.2 LEFT LEG PARESTHESIAS: ICD-10-CM

## 2025-06-03 PROCEDURE — 72100 X-RAY EXAM L-S SPINE 2/3 VWS: CPT | Mod: TC

## 2025-06-03 PROCEDURE — 99999 PR PBB SHADOW E&M-EST. PATIENT-LVL III: CPT | Mod: PBBFAC,GC,,

## 2025-06-03 PROCEDURE — 72100 X-RAY EXAM L-S SPINE 2/3 VWS: CPT | Mod: 26,,, | Performed by: RADIOLOGY

## 2025-06-03 RX ORDER — DICLOFENAC SODIUM 10 MG/G
2 GEL TOPICAL DAILY
Qty: 200 G | Refills: 1 | Status: SHIPPED | OUTPATIENT
Start: 2025-06-03

## 2025-06-03 RX ORDER — METHOCARBAMOL 500 MG/1
500 TABLET, FILM COATED ORAL 3 TIMES DAILY
Qty: 30 TABLET | Refills: 0 | Status: SHIPPED | OUTPATIENT
Start: 2025-06-03 | End: 2025-06-13

## 2025-06-03 RX ORDER — MELOXICAM 15 MG/1
15 TABLET ORAL DAILY
Qty: 90 TABLET | Refills: 0 | Status: SHIPPED | OUTPATIENT
Start: 2025-06-03

## 2025-06-10 DIAGNOSIS — R10.32 LEFT INGUINAL PAIN: ICD-10-CM

## 2025-06-10 RX ORDER — METHOCARBAMOL 500 MG/1
500 TABLET, FILM COATED ORAL 3 TIMES DAILY
Qty: 30 TABLET | Refills: 0 | Status: SHIPPED | OUTPATIENT
Start: 2025-06-10 | End: 2025-06-20

## 2025-06-16 ENCOUNTER — CLINICAL SUPPORT (OUTPATIENT)
Dept: REHABILITATION | Facility: HOSPITAL | Age: 65
End: 2025-06-16
Payer: MEDICARE

## 2025-06-16 DIAGNOSIS — R29.898 HIP WEAKNESS: Primary | ICD-10-CM

## 2025-06-16 DIAGNOSIS — Z74.09 IMPAIRED FUNCTIONAL MOBILITY AND ACTIVITY TOLERANCE: ICD-10-CM

## 2025-06-16 PROCEDURE — 97530 THERAPEUTIC ACTIVITIES: CPT | Mod: PO

## 2025-06-16 PROCEDURE — 97161 PT EVAL LOW COMPLEX 20 MIN: CPT | Mod: PO

## 2025-06-16 NOTE — PATIENT INSTRUCTIONS
Access Code: [email or web address]  URL: [email or web address]  Date: 06/16/2025  Prepared by: Joselito John    Exercises  - Hooklying Isometric Clamshell  - 1 x daily - 7 x weekly - 3 sets - 10 reps  - Supine Hip Adduction Isometric with Ball  - 1 x daily - 7 x weekly - 3 sets - 10 reps - 5 s hold  - Supine Heel Slide (Mirrored)  - 1 x daily - 7 x weekly - 3 sets - 10 reps  - Sit to Stand with Armchair  - 1 x daily - 7 x weekly - 2 sets - 5 reps

## 2025-06-16 NOTE — PROGRESS NOTES
Outpatient Rehab    Physical Therapy Evaluation    Patient Name: Bobby Tavarez  MRN: [id number]  YOB: 1960  Encounter Date: 6/16/2025    Therapy Diagnosis:   Encounter Diagnoses   Name Primary?    Hip weakness Yes    Impaired functional mobility and activity tolerance      Physician: Olivia Robert*    Physician Orders: Eval and Treat  Medical Diagnosis: Left inguinal pain  Surgical Diagnosis: Not applicable for this Episode   Surgical Date: Not applicable for this Episode  Days Since Last Surgery: Not applicable for this Episode    Visit # / Visits Authorized:  1 / 1  Insurance Authorization Period: 6/3/2025 to 12/31/2025  Date of Evaluation: 6/16/2025  Plan of Care Certification: 6/16/2025 to 8/25/2025     Time In: 1001   Time Out: 1047  Total Time (in minutes): 46   Total Billable Time (in minutes): 46    Intake Outcome Measure for FOTO Survey    Therapist reviewed FOTO scores for Bobby Tavarez on 6/16/2025.   FOTO report - see Media section or FOTO account episode details.     Intake Score: 26 (56)%    Precautions:     Standard, fall      Subjective   History of Present Illness  Bobby is a 65 y.o. male      The patient reports a medical diagnosis of Left inguinal pain (R10.32).    Diagnostic tests related to this condition: X-ray.   X-Ray Details: 06/2025: Impression:     No acute process seen.     DJD and dish.    History of Present Condition/Illness: He reports pulling a muscle in his groin. This has been going on for about 3 weeks. He reports he was on a machine that broke and made hi first his whole body. He also reports pain all the way on his left side including his hip and lower back area. He had knee pain before this and is now walking with a cane.     Activities of Daily Living  Social history was obtained from Patient.    General Prior Level of Function Comments: indepdnent  General Current Level of Function Comments: walks with cane, hard to keep balance, avoids activties, and  navdeep itz trust his leg.  Patient Responsibilities: Community mobility, Driving, Financial management, Health management, Home management, Meal prep, Personal ADL, Laundry, Shopping, Yard work    Previously independent with activities of daily living? Yes     Currently independent with activities of daily living? No  Activities currently needing assistance include Functional mobility, Bed mobility, and Transfers.        Previously independent with instrumental activities of daily living? Yes     Currently independent with instrumental activities of daily living? No  Activities currently needing assistance include: Community mobility.            Pain     Patient reports a current pain level of 0/10. Pain at best is reported as 0/10. Pain at worst is reported as 4/10.   Location: left groin and hip  Clinical Progression (since onset): Worsening  Pain Qualities: Aching, Other (Comment)  Other Pain Qualities: contant tooth ache pain  Pain-Relieving Factors: Heat, Ice, Sitting  Pain-Aggravating Factors: Other (Comment), Transfers, Standing  Other Pain-Aggravating Factors: rising from a seated position, kicking his leg straight         Living Arrangements  Living Situation  Living Arrangements: Spouse/significant other  Support Systems: Spouse/significant other    Home Setup  Home Access: Level entry  Number of Levels in Home: One level        Employment  Patient reports: Does the patient's condition impact their ability to work?  Employment Status: Retired          Past Medical History/Physical Systems Review:   Bobby Tavarez  has a past medical history of Essential (primary) hypertension, Hyperlipidemia, Obesity, morbid, BMI 50 or higher, Osteoarthritis of both knees, and Prediabetes.    Bobby Tavarez  has a past surgical history that includes Colonoscopy (N/A, 10/6/2023); Colonoscopy (N/A, 11/15/2023); and Colonoscopy (N/A, 5/22/2024).    Bobby has a current medication list which includes the following  prescription(s): amlodipine, cholecalciferol (vitamin d3), diclofenac sodium, epinephrine, meloxicam, methocarbamol, and senna.    Review of patient's allergies indicates:  No Known Allergies     Objective       Hip Range of Motion   Right Hip   Active (deg) Passive (deg) Pain   Flexion 100       Extension         ABduction         ADduction         External Rotation 90/90 35       External Rotation Prone         Internal Rotation 90/90 30       Internal Rotation Prone             Left Hip   Active (deg) Passive (deg) Pain   Flexion 60       Extension         ABduction         ADduction         External Rotation 90/90 15 15 Yes   External Rotation Prone         Internal Rotation 90/90 15 15 Yes   Internal Rotation Prone                  Knee Range of Motion   Right Knee   Active (deg) Passive (deg) Pain   Flexion 120       Extension             Left Knee   Active (deg) Passive (deg) Pain   Flexion 110       Extension                            Hip Strength - Planes of Motion   Right Strength Right Pain Left Strength Left  Pain   Flexion (L2) 5   2     Extension 5   3     ABduction 5   2     ADduction 5   2     Internal Rotation 5   2     External Rotation 5   2             Lumbar/Pelvic Girdle Special Tests  Pelvic Girdle / Sacrum Tests  Positive: Left DAVID and Left FADIR  Negative: Right DAVID and Right FADIR         Hip Special Tests  Intra-Articular/Impingement Tests  Positive: Left DAVID, Left FADIR, and Left Scour  Negative: Right DAVID and Right FADIR  90/90 Hamstring Test  Negative: Right and Left          Knee Special Tests                 Fall Risk  Functional mobility test results suggest the patient is: At Risk for Falls  Timed Up & Go (TUG)  Time: 21 seconds  Observations: Slow tentative pace  An older adult who takes >=12 seconds to complete the TUG is at risk for falling.       Sit to Stand Testing  The patient completed 5 sit to stand transfers in 50 sec. heavy UE , incomplete hip extension at top               Gait Analysis  Gait Pattern: Antalgic  Walking Speed: Decreased    Right Side Walking Observations  Decreased: Stance Time and Step Length       Left Side Walking Observations  Decreased: Stance Time and Step Length     Trunk Observations During Gait: Right lateral lean over stance limb    Gait Analysis Details  With Single point cane - avoiding increased weight bearing on left side.          Treatment:  Therapeutic Activity  TA 1: Home Exercise Program education and instruction which can be found under patient instructions.    Time Entry(in minutes):  PT Evaluation (Low) Time Entry: 22  Therapeutic Activity Time Entry: 24    Assessment & Plan   Assessment  Bobby presents with a condition of Low complexity.   Presentation of Symptoms: Evolving  Will Comorbidities Impact Care: Yes  See Past medical history    Functional Limitations: Activity tolerance, Ambulating on uneven surfaces, Bed mobility, Carrying objects, Community integration, Completing self-care activities, Completing work/school activities, Decreased ambulation distance/endurance, Disrupted sleep pattern, Driving, Fine motor coordination, Functional mobility, Gait limitations, Getting off the floor, Gross motor coordination, Increased risk of fall, Maintaining balance, Manipulating objects, Pain when reaching, Pain with ADLs/IADLs, Painful locomotion/ambulation, Participating in leisure activities, Performing household chores, Participating in sports, Proprioception, Range of motion, Reaching, Sitting tolerance, Squatting, Standing tolerance, Transfers  Impairments: Abnormal coordination, Abnormal gait, Abnormal muscle firing, Abnormal muscle tone, Abnormal or restricted range of motion, Activity intolerance, Impaired balance, Impaired physical strength, Lack of appropriate home exercise program, Pain with functional activity, Safety issue, Weight-bearing intolerance  Personal Factors Affecting Prognosis: Pain    Patient Goal for Therapy (PT):  Decrease pain, improve mobility  Prognosis: Good  Prognosis Details: Patient has good prognosis when past medical history, prior level of function, current level of function, and objective measurements take in initial evaluation are considered.  Assessment Details: Patient presents with signs and symptoms consistent with their medical diagnosis with additional hip abductor pain and weakness. Patient presents with impaired hip mobility, range of motion, gait, and strength. Patient also presents with increased pain that occurs with hip adductor activation and any rotation near his hip joint. At this time, the patients limitations are preventing them from performing hobbies and ADLs around their house without increased difficulty, pain, and discomfort.    Plan  From a physical therapy perspective, the patient would benefit from: Skilled Rehab Services    Planned therapy interventions include: Therapeutic exercise, Therapeutic activities, Neuromuscular re-education, Manual therapy, ADLs/IADLs, Canalith repositioning, Cognitive functional training, Community/work reintegration, Gait training, Orthotic management and training, Sensory integration, Work conditioning, Work hardening, and Wound care.    Planned modalities to include: Biofeedback, Cryotherapy (cold pack), Electrical stimulation - attended, Electrical stimulation - passive/unattended, Mechanical traction, Paraffin bath, Thermotherapy (hot pack), and Ultrasound.        Visit Frequency: 2 times Per Week for 6 Weeks.       This plan was discussed with Patient and Therapy assistant.   Discussion participants: Agreed Upon Plan of Care  Plan details: Progress with current plan of care as tolerated using selected interventions and dry needling as needed for pain reduction.          The patient's spiritual, cultural, and educational needs were considered, and the patient is agreeable to the plan of care and goals.     Education  Education was done with Patient. The  patient's learning style includes Demonstration, Listening, and Pictures/video. The patient Demonstrates understanding and Verbalizes understanding.         HEP       Goals:   Active       LTG       Patient will demonstrate a 4+/5 or better on all tested MMT in order to improve functional mobility and reduce risks for compensation.         Start:  06/16/25    Expected End:  08/25/25            Patinet will demonstrate a FOTO score with a 10% improvement in order to show improved function.         Start:  06/16/25    Expected End:  08/25/25            Patient will demonstrate negative Faddir and Luis special tests in order to demonstrate improved tolerance for functional hip mobility.        Start:  06/16/25    Expected End:  08/25/25               STG       Patient will demonstrate 100% compliance with their Home Exercise Porgram in order to improve their current level of function.         Start:  06/16/25    Expected End:  07/21/25            Patient will report a pain level of 0/10 at rest in order to improve qulaity of life.         Start:  06/16/25    Expected End:  07/21/25            Patient will demonstrate 120 degrees of hip flexion bilaterally in order to improve functional mobility.          Start:  06/16/25    Expected End:  07/21/25                Joselito John, PT

## 2025-06-17 ENCOUNTER — TELEPHONE (OUTPATIENT)
Dept: PAIN MEDICINE | Facility: CLINIC | Age: 65
End: 2025-06-17

## 2025-06-17 ENCOUNTER — OFFICE VISIT (OUTPATIENT)
Dept: PAIN MEDICINE | Facility: CLINIC | Age: 65
End: 2025-06-17
Payer: MEDICARE

## 2025-06-17 VITALS
HEART RATE: 89 BPM | HEIGHT: 65 IN | BODY MASS INDEX: 49.15 KG/M2 | SYSTOLIC BLOOD PRESSURE: 113 MMHG | WEIGHT: 295 LBS | DIASTOLIC BLOOD PRESSURE: 83 MMHG

## 2025-06-17 DIAGNOSIS — M25.552 CHRONIC LEFT HIP PAIN: ICD-10-CM

## 2025-06-17 DIAGNOSIS — G89.29 CHRONIC LEFT HIP PAIN: ICD-10-CM

## 2025-06-17 DIAGNOSIS — G89.4 CHRONIC PAIN SYNDROME: ICD-10-CM

## 2025-06-17 DIAGNOSIS — M16.12 PRIMARY OSTEOARTHRITIS OF LEFT HIP: Primary | ICD-10-CM

## 2025-06-17 PROCEDURE — 1159F MED LIST DOCD IN RCRD: CPT | Mod: CPTII,S$GLB,, | Performed by: NURSE PRACTITIONER

## 2025-06-17 PROCEDURE — 3074F SYST BP LT 130 MM HG: CPT | Mod: CPTII,S$GLB,, | Performed by: NURSE PRACTITIONER

## 2025-06-17 PROCEDURE — 1125F AMNT PAIN NOTED PAIN PRSNT: CPT | Mod: CPTII,S$GLB,, | Performed by: NURSE PRACTITIONER

## 2025-06-17 PROCEDURE — 3288F FALL RISK ASSESSMENT DOCD: CPT | Mod: CPTII,S$GLB,, | Performed by: NURSE PRACTITIONER

## 2025-06-17 PROCEDURE — 99204 OFFICE O/P NEW MOD 45 MIN: CPT | Mod: S$GLB,,, | Performed by: NURSE PRACTITIONER

## 2025-06-17 PROCEDURE — 3079F DIAST BP 80-89 MM HG: CPT | Mod: CPTII,S$GLB,, | Performed by: NURSE PRACTITIONER

## 2025-06-17 PROCEDURE — 1101F PT FALLS ASSESS-DOCD LE1/YR: CPT | Mod: CPTII,S$GLB,, | Performed by: NURSE PRACTITIONER

## 2025-06-17 PROCEDURE — 99999 PR PBB SHADOW E&M-EST. PATIENT-LVL III: CPT | Mod: PBBFAC,,, | Performed by: NURSE PRACTITIONER

## 2025-06-17 PROCEDURE — 3008F BODY MASS INDEX DOCD: CPT | Mod: CPTII,S$GLB,, | Performed by: NURSE PRACTITIONER

## 2025-06-17 PROCEDURE — G2211 COMPLEX E/M VISIT ADD ON: HCPCS | Mod: S$GLB,,, | Performed by: NURSE PRACTITIONER

## 2025-06-17 PROCEDURE — 1160F RVW MEDS BY RX/DR IN RCRD: CPT | Mod: CPTII,S$GLB,, | Performed by: NURSE PRACTITIONER

## 2025-06-17 NOTE — H&P (VIEW-ONLY)
Chronic Pain - New Consult    Referring Physician: Jame Jean MD    Chief Complaint: No chief complaint on file.       SUBJECTIVE:  06/17/2025:    CHIEF COMPLAINT:  Left groin pain radiating into the left calf    HPI:  Patient presents with left groin pain radiating into the left calf, which started two months ago. The pain is sharp and rated as 10/10 when getting up from a seated position, extending from the hips down the left leg. He reports severe pain when rising, stating it is difficult to get up and he has to twist his body to do so. Pain is mitigated when sitting.    He reports difficulty walking due to pain, necessitating the use of a walking aid. Occasional pain is also reported when turning during sleep.    He is currently taking Mobic and Robaxin for pain management but reports they are not providing relief. He recently started PT for his hips but has not noticed any improvement yet.    PREVIOUS TREATMENTS:  Patient recently started physical therapy for his hips. The effectiveness of this treatment has not yet been determined.    IMAGING:  An X-ray of the patient's hips and knee revealed pathology in the hips. The imaging also showed severe narrowing in the knee, indicative of arthritis, and moderate joint space narrowing in the left hip.    MEDICATIONS:  Patient is on Mobic (meloxicam), taking 1 tablet daily, but it is not providing benefit. He is also on Robaxin (methocarbamol), which is similarly not providing benefit. Patient is taking Tylenol Extra Strength Arthritis.      ROS:  General: -fever, -chills, -fatigue, -weight gain, -weight loss, +pain with movement, +nightime pain  Eyes: -vision changes, -redness, -discharge  ENT: -ear pain, -nasal congestion, -sore throat  Cardiovascular: -chest pain, -palpitations, -lower extremity edema  Respiratory: -cough, -shortness of breath  Gastrointestinal: -abdominal pain, -nausea, -vomiting, -diarrhea, -constipation, -blood in stool  Genitourinary:  -dysuria, -hematuria, -frequency  Musculoskeletal: -joint pain, -muscle pain, +limb pain, +calf pain, +difficulty standing up  Skin: -rash, -lesion  Neurological: -headache, -dizziness, -numbness, -tingling  Psychiatric: -anxiety, -depression, -sleep difficulty        Patient denies night fever/night sweats, urinary incontinence, bowel incontinence, significant weight loss, significant motor weakness, and loss of sensations.    Physical Therapy/Home Exercise: yes      Pain Disability Index Review:       No data to display                Pain Medications:    - Adjuvant Medications: Mobic (Meloxicam) and Robaxin ( Methocarbamol)     report:  Not applicable    Pain Procedures: n/a    Imaging 06/03/25:   XR LUMBAR SPINE AP AND LATERAL     CLINICAL HISTORY:  Paresthesia of skin     FINDINGS:  Lumbar spine two views:     Alignment is normal.  There is mild-moderate DJD and dish.  No acute fracture dislocation bone destruction seen.  No acute trauma seen.    Imaging 05/13/25:  XR KNEE ORTHO BILAT     CLINICAL HISTORY:  Bilateral primary osteoarthritis of knee     TECHNIQUE:  AP standing radiograph of both knees, lateral radiograph of the left knee, lateral radiograph of the right knee, Merchant radiograph of both knees, Merchant radiograph of the right knee, Merchant radiograph the left knee (6 images)     COMPARISON:  Radiographs 05/12/2023     FINDINGS:  Right knee: Severe joint space narrowing the medial tibiofemoral compartment of the right knee with associated subchondral sclerosis and varus angulation.  Tricompartmental osteophyte formation is noted.     Left knee: Severe joint space narrowing of the medial tibiofemoral compartment of the left knee with associated subchondral sclerosis and cystic changes.  Significant varus angulation is also present.  Tricompartmental osteophyte formation is noted.    Imaging 05/08/13:  EXAM:  XR HIP WITH PELVIS WHEN PERFORMED 2 OR 3 VIEWS LEFT     CLINICAL HISTORY:  Left hip  pain     FINDINGS: 3 views.  Moderate joint space narrowing.  No acute fracture or dislocation. No acute bony abnormality.    Past Medical History:   Diagnosis Date    Essential (primary) hypertension     Hyperlipidemia     Obesity, morbid, BMI 50 or higher     Osteoarthritis of both knees     Prediabetes      Past Surgical History:   Procedure Laterality Date    COLONOSCOPY N/A 10/6/2023    Procedure: COLONOSCOPY;  Surgeon: Edwina Garcia MD;  Location: [facility] (2ND FLR);  Service: Endoscopy;  Laterality: N/A;  10/2 BMI: 48.68  Not taking Ozempic  Referral:  Jame Jean MD  Extended PEG prep  Inst emailed to [email or web address]  LW  10/4-precall complete-Kpvt  10/5-pt notified of floor change-Kpvt    COLONOSCOPY N/A 11/15/2023    Procedure: COLONOSCOPY;  Surgeon: Bj Saleh MD;  Location: [facility] (2ND FLR);  Service: Endoscopy;  Laterality: N/A;  10/20/23-PEG extended prep, instructions via portal, pt is not taking Ozempic-DS  11/9-precall complete-MS    COLONOSCOPY N/A 5/22/2024    Procedure: COLONOSCOPY;  Surgeon: Bj Saleh MD;  Location: [facility] (2ND FLR);  Service: Endoscopy;  Laterality: N/A;  3/22/24: instructions sent via portal. PEG prep-GD   Repeat colonoscopy in 6 months for surveillance.-teodora  5/15-pre call complete-confirmed last dose of ozempic 5/9/24     Social History[1]  Family History   Problem Relation Name Age of Onset    Diabetes Mother         Review of patient's allergies indicates:  No Known Allergies    Current Outpatient Medications   Medication Sig    amLODIPine (NORVASC) 10 MG tablet Take 1 tablet (10 mg total) by mouth once daily.    cholecalciferol, vitamin D3, (VITAMIN D3) 50 mcg (2,000 unit) Cap capsule Vitamin D3 50 mcg (2,000 unit) capsule   Take 1 capsule every day by oral route.    diclofenac sodium (VOLTAREN) 1 % Gel Apply 2 g topically once daily.    EPINEPHrine (EPIPEN) 0.3 mg/0.3 mL AtIn Inject 0.3 mLs (0.3 mg total) into the muscle once. for 1 dose  "   meloxicam (MOBIC) 15 MG tablet Take 1 tablet (15 mg total) by mouth once daily.    methocarbamoL (ROBAXIN) 500 MG Tab Take 1 tablet (500 mg total) by mouth 3 (three) times daily. for 10 days    SENNA 8.6 mg tablet Take 1 tablet by mouth once daily.     No current facility-administered medications for this visit.     OBJECTIVE:    /83   Pulse 89   Ht 5' 5.35" (1.66 m)   Wt 133.8 kg (294 lb 15.6 oz)   BMI 48.56 kg/m²     PHYSICAL EXAMINATION:      General Musculoskeletal Exam   Gait: antalgic     Left Hip Exam     Tenderness   The patient tender to palpation of the trochanteric bursa.    Crepitus   The patient has crepitus of the trochanteric bursa.    Range of Motion   Abduction:  abnormal   Adduction:  abnormal   Extension:  abnormal   Flexion:  abnormal   External rotation:  abnormal   Internal rotation: abnormal     Tests   Log Roll: positive          Muscle Strength   Right Lower Extremity   Hip Abduction: 5/5   Hip Adduction: 5/5   Left Lower Extremity   Hip Abduction: 4/5   Hip Adduction: 4/5         ASSESSMENT: 65 y.o. male with  left groin  and hip pain, consistent with      1. Primary osteoarthritis of left hip        2. Chronic left hip pain        3. Chronic pain syndrome              PLAN:   LEFT HIP OSTEOARTHRITIS:  - Recommend left hip injection under X-ray guidance.  - Patient agreed to the procedure.  - Ordered the injection to be performed by Dr. Issa.  - Take Tylenol extra strength arthritis for pain, up to 6 pills (3000mg) per day.  - Discontinue Mobic (meloxicam).    GENERAL MUSCULOSKELETAL HEALTH:  - Focus on strengthening legs.  - Work on weight management.  - Continue Robaxin as needed for muscle spasms.  - continuing complete physical therapy    FOLLOW-UP:  - Follow up after hip injection  for ongoing care.    ELYSIA EchevarriaC  Interventional Pain Management    This note was generated with the assistance of ambient listening technology. Verbal consent was obtained by the " patient and accompanying visitor(s) for the recording of patient appointment to facilitate this note. I attest to having reviewed and edited the generated note for accuracy, though some syntax or spelling errors may persist. Please contact the author of this note for any clarification.     06/17/2025         [1]   Social History  Socioeconomic History    Marital status:    Occupational History    Occupation: , retired   Tobacco Use    Smoking status: Never    Smokeless tobacco: Never   Substance and Sexual Activity    Alcohol use: Never    Drug use: Never

## 2025-06-17 NOTE — TELEPHONE ENCOUNTER
CC:  Rik Steward is here today for follow up of left knee pain, patient requesting cortisone injection.    PCP No Pcp    Referring MD   Medications: medications verified, no change  Refills needed today?  NO  denies known Latex allergy or symptoms of Latex sensitivity.         Scheduled a f/u appointment in office with Dr. Alanis on 8/4.

## 2025-06-17 NOTE — TELEPHONE ENCOUNTER
Scheduled patient for a  Left intra-articular hip injection  with Dr. Alanis on 7/14. Patient voiced his understanding and confirmed procedure date.

## 2025-06-20 ENCOUNTER — TELEPHONE (OUTPATIENT)
Dept: BARIATRICS | Facility: CLINIC | Age: 65
End: 2025-06-20
Payer: MEDICARE

## 2025-06-23 ENCOUNTER — HOSPITAL ENCOUNTER (OUTPATIENT)
Facility: HOSPITAL | Age: 65
Discharge: HOME | End: 2025-06-23
Attending: ANESTHESIOLOGY | Admitting: ANESTHESIOLOGY
Payer: MEDICARE

## 2025-06-23 VITALS
TEMPERATURE: 98 F | DIASTOLIC BLOOD PRESSURE: 77 MMHG | SYSTOLIC BLOOD PRESSURE: 114 MMHG | BODY MASS INDEX: 46.77 KG/M2 | WEIGHT: 291 LBS | HEIGHT: 66 IN | OXYGEN SATURATION: 93 % | RESPIRATION RATE: 18 BRPM | HEART RATE: 75 BPM

## 2025-06-23 DIAGNOSIS — G89.29 CHRONIC LEFT HIP PAIN: ICD-10-CM

## 2025-06-23 DIAGNOSIS — Z00.00 ENCOUNTER FOR MEDICARE ANNUAL WELLNESS EXAM: ICD-10-CM

## 2025-06-23 DIAGNOSIS — M25.552 CHRONIC LEFT HIP PAIN: ICD-10-CM

## 2025-06-23 PROCEDURE — 25500020 PHARM REV CODE 255: Performed by: ANESTHESIOLOGY

## 2025-06-23 PROCEDURE — 77002 NEEDLE LOCALIZATION BY XRAY: CPT | Mod: 26,,, | Performed by: ANESTHESIOLOGY

## 2025-06-23 PROCEDURE — 20610 DRAIN/INJ JOINT/BURSA W/O US: CPT | Mod: LT | Performed by: ANESTHESIOLOGY

## 2025-06-23 PROCEDURE — 20610 DRAIN/INJ JOINT/BURSA W/O US: CPT | Mod: LT,,, | Performed by: ANESTHESIOLOGY

## 2025-06-23 PROCEDURE — 63600175 PHARM REV CODE 636 W HCPCS: Performed by: ANESTHESIOLOGY

## 2025-06-23 PROCEDURE — 25000003 PHARM REV CODE 250: Performed by: ANESTHESIOLOGY

## 2025-06-23 RX ORDER — LIDOCAINE HYDROCHLORIDE 10 MG/ML
INJECTION, SOLUTION EPIDURAL; INFILTRATION; INTRACAUDAL; PERINEURAL
Status: DISCONTINUED | OUTPATIENT
Start: 2025-06-23 | End: 2025-06-23 | Stop reason: HOSPADM

## 2025-06-23 RX ORDER — TRIAMCINOLONE ACETONIDE 40 MG/ML
INJECTION, SUSPENSION INTRA-ARTICULAR; INTRAMUSCULAR
Status: DISCONTINUED | OUTPATIENT
Start: 2025-06-23 | End: 2025-06-23 | Stop reason: HOSPADM

## 2025-06-23 RX ORDER — BUPIVACAINE HYDROCHLORIDE AND EPINEPHRINE 2.5; 5 MG/ML; UG/ML
INJECTION, SOLUTION EPIDURAL; INFILTRATION; INTRACAUDAL; PERINEURAL
Status: DISCONTINUED | OUTPATIENT
Start: 2025-06-23 | End: 2025-06-23 | Stop reason: HOSPADM

## 2025-06-23 NOTE — DISCHARGE SUMMARY
[facility] - [facility] Management (Hospital)  Discharge Note  Short Stay    Procedure(s) (LRB):  Left intra-articular hip injection (Left)      OUTCOME: Patient tolerated treatment/procedure well without complication and is now ready for discharge.    DISPOSITION: Home or Self Care    FINAL DIAGNOSIS:  <principal problem not specified>    FOLLOWUP: In clinic    DISCHARGE INSTRUCTIONS:  No discharge procedures on file.     TIME SPENT ON DISCHARGE: 10 minutes

## 2025-06-23 NOTE — PLAN OF CARE
Pt denies pain or discomfort @ this time. Pt states he is ready to be discharged home @ this time. Discharge instructions reviewed with patient, with time allotted for questions. Pt verbalizes understanding of instructions and states they have no further questions @ this time. Procedure site is clean, dry and intact. Band-aids in place. Vital signs are stable. No acute distress noted. Staff is at bedside to assist patient. Wheeled to discharge area. Home with family in private vehicle.

## 2025-06-23 NOTE — DISCHARGE INSTRUCTIONS
Home Care Instructions Pain Management:    1.  DIET:    You may resume your normal diet today.    2.  BATHING:    You may shower with luke warm water.    3.  DRESSING:    You may remove your bandage today.    4.  ACTIVITY LEVEL:      You may resume your normal activities 24 hours after your procedure.    5.  MEDICATIONS:    You may resume your normal medications today.    6.  SPECIAL INSTRUCTIONS:    No heat to the injection site for 24 hours including bath or shower, heating pad, moist heat or hot tubs.    Use an ice pack to the injection site for any pain or discomfort.  Apply ice packs for 20 minute intervals as needed.    If you have received any sedatives by mouth today, you can not drive for 12 hours.    If you have received sedation through an IV, you can not drive for 24 hours.    PLEASE CALL YOUR DOCTOR FOR THE FOLLOWIN.  Redness or swelling around the injection site.  2.  Fever of 101 degrees.  3.  Drainage (pus) from the injection site.  4.  For any continuous bleeding (some dried blood over the incision is normal.)    FOR EMERGENCIES:    If any unusual problems or difficulties occur during clinic hours, call [phone] or dial [phone].    Follow up with with your physician in 2-3 weeks.

## 2025-06-23 NOTE — OP NOTE
Patient Name: Bobby Tavarez  MRN: [id number]    INFORMED CONSENT: The procedure, risks, benefits and options were discussed with patient. There are no contraindications to the procedure. The patient expressed understanding and agreed to proceed. The personnel performing the procedure was discussed. I verify that I personally obtained Bobby's consent prior to the start of the procedure and the signed consent can be found on the patient's chart.    PROCEDURE: LEFT ACETABULOFEMORAL JOINT INJECTION (HIP)    Procedure Date: 6/23/2025  Surgeons and Role:     * Avila Alanis III, MD - Primary  Anesthesia: Choice  Procedure(s) (LRB):  Left intra-articular hip injection (Left)    Pre Procedure diagnosis:   Primary osteoarthritis of left hip [M16.12]  Chronic left hip pain [M25.552, G89.29]  Chronic pain syndrome [G89.4]    Post-Procedure diagnosis: Same    Sedation: None    DESCRIPTION OF PROCEDURE: The patient was brought to the procedure room. The patient was positioned supine on the fluoroscopy table.  Continuous hemodynamic monitoring was initiated including blood pressure, EKG, and pulse oximetry. The skin overlying the hip was prepped with chlorhexidine three times and draped in a sterile fashion.  The skin and subcutaneous tissue was anesthetized using 5 mL of lidocaine 1%.  A 22 gauge, 5 inch spinal needle was slowly advanced under fluoroscopic guidance to the midpoint of the femoral neck.   Negative aspiration was confirmed. 1 mL of Omnipaque 300 was injected confirming intra-articular contrast spread. A combination of 4 mL of bupivacaine 0.25% and 40 mg triamcinolone was easily injected. The needle was removed and bleeding was nill.  A sterile dressing was applied. Bobby was taken back to the recovery room for further observation.     Blood Loss: Nil  Specimen: None

## 2025-06-26 ENCOUNTER — TELEPHONE (OUTPATIENT)
Dept: BARIATRICS | Facility: CLINIC | Age: 65
End: 2025-06-26
Payer: MEDICARE

## 2025-06-27 NOTE — PROGRESS NOTES
"Subjective     Patient ID: Bobby Tavarez is a 65 y.o. male.    Chief Complaint: Consult    CC: Weight    New pt to me, referred by Jame Jean MD  [address] [place] [address] , with Patient Active Problem List:     Primary hypertension     Osteoarthritis of both knees     Class 3 severe obesity in adult     Hyperlipidemia     Prediabetes     Colon polyp     Left inguinal pain     Hip weakness     Impaired functional mobility and activity tolerance      Lab Results       Component                Value               Date                       HGBA1C                   5.6                 07/09/2024                 HGBA1C                   5.7 (H)             05/12/2023            Lab Results       Component                Value               Date                       LDLCALC                  92.0                07/09/2024                 CREATININE               1.0                 07/09/2024                  Current attempts at weight loss: States he has been more salad.     Exercise-  States he will walk few days a week x 5 min     Previous diet attempts: denies.     History of medication for loss: Denies.   checked today     Heaviest weight: 303#    Lightest weight: 260#    Goal weight: 200#      Last eye exam: March 2025.   Optometry Open angle with borderline findings and low glaucoma risk in both eyes                                       Provider:    Cardiac studies:    Sleep studies:      Typical eating patterns: Retired. Lives with wife. [employer]fe cooks.   Breakfast: grits and eggs, mitchell +/-. May skip.    lunch: skips. Weekends: same.     dinner: beans and rice. Smothered chicken, mac and cheese. Roast pot salad, rice. If out- chinese food.     snacks: granola bars, cookies,     Beverages: juice, water, tea- sweet. ETOH- denies.     Willingness to change:  10/10        BMR: 1696    PBF:  53.5%      Review of Systems       Objective   /81   Pulse 90   Ht 5' 4.5" (1.638 m)   Wt 132 kg " (291 lb 1.6 oz)   SpO2 95%   BMI 49.20 kg/m²     Physical Exam  Vitals reviewed.   Constitutional:       General: He is not in acute distress.     Appearance: He is well-developed.      Comments: With severe obesity     HENT:      Head: Normocephalic and atraumatic.   Eyes:      General: No scleral icterus.     Pupils: Pupils are equal, round, and reactive to light.   Neck:      Thyroid: No thyromegaly.   Cardiovascular:      Rate and Rhythm: Normal rate and regular rhythm.      Heart sounds: Normal heart sounds. No murmur heard.     No friction rub. No gallop.   Pulmonary:      Effort: Pulmonary effort is normal. No respiratory distress.      Breath sounds: Normal breath sounds. No wheezing.   Abdominal:      General: Bowel sounds are normal. There is no distension.      Palpations: Abdomen is soft.      Tenderness: There is no abdominal tenderness.   Musculoskeletal:         General: Normal range of motion.      Cervical back: Normal range of motion and neck supple.   Skin:     General: Skin is warm and dry.   Neurological:      Mental Status: He is alert and oriented to person, place, and time.   Psychiatric:         Behavior: Behavior normal.         Judgment: Judgment normal.            Assessment and Plan     1. Class 3 severe obesity with serious comorbidity and body mass index (BMI) of 40.0 to 44.9 in adult, unspecified obesity type  -     Ambulatory referral/consult to Bariatric/Obesity Medicine    2. Hyperlipidemia, unspecified hyperlipidemia type    3. Primary hypertension    4. Prediabetes    Other orders  -     topiramate (TOPAMAX) 25 MG tablet; Take 1 tablet (25 mg total) by mouth 2 (two) times daily.  Dispense: 60 tablet; Refill: 1        1. Class 3 severe obesity with serious comorbidity and body mass index (BMI) of 40.0 to 44.9 in adult, unspecified obesity type (Primary)  Medical and surgical options discussed today. Seminar info given.    - Ambulatory referral/consult to Bariatric/Obesity  Medicine      Patient was informed that topiramate is used for migraine prevention and seizures. Weight loss is a common side effect that is well documented. S/he understands this. S/he was informed of the potential side effects such as serious and possibly fatal rash in which case the medication should be discontinued immediately. Paresthesias, forgetfulness, fatigue, kidney stones, GI symptoms, and changes in lab values such as electrolytes, blood counts and kidney function.      Start topiramate  in the evening for 1 week, then morning and evening.     Discussed caution with medication. Report any vision changes immediately.       Increase low impact activity as tolerated.  Avoid high impact activity, very heavy lifting or other exercise regimens that may cause significant discomfort.     Add some type of resistance training 2-3 days a week. These can be body weight exercises, light weight or elastic bands. [employer] and [employer] are great sources for free work out plans and videos.       Patient counseled in strategies for long term weight loss and maintenance: Keeping a food diary, exercise for 1 hour a day and eating more protein than carbohydrates.       No soda, sweet tea, juices or lemonade. All drinks should be 5 calories or less    1200 jory pb meal planner, meal ideas and exercise handouts given.     2. Hyperlipidemia, unspecified hyperlipidemia type  Recheck after 10% TBW lost.  Expect improvement with weight loss.     3. Primary hypertension  The current medical regimen is effective;  continue present plan and medications. Expect improvement with weight loss.     4. Prediabetes  Discussed decreasing the risks of diabetes with changes in diet, routine exercise and losing weight.  Could also consider metformin.             No follow-ups on file.

## 2025-06-30 ENCOUNTER — OFFICE VISIT (OUTPATIENT)
Dept: BARIATRICS | Facility: CLINIC | Age: 65
End: 2025-06-30
Payer: MEDICARE

## 2025-06-30 VITALS
BODY MASS INDEX: 48.5 KG/M2 | HEART RATE: 90 BPM | OXYGEN SATURATION: 95 % | HEIGHT: 65 IN | SYSTOLIC BLOOD PRESSURE: 113 MMHG | DIASTOLIC BLOOD PRESSURE: 81 MMHG | WEIGHT: 291.13 LBS

## 2025-06-30 DIAGNOSIS — R73.03 PREDIABETES: ICD-10-CM

## 2025-06-30 DIAGNOSIS — E66.813 CLASS 3 SEVERE OBESITY WITH SERIOUS COMORBIDITY AND BODY MASS INDEX (BMI) OF 40.0 TO 44.9 IN ADULT, UNSPECIFIED OBESITY TYPE: Primary | ICD-10-CM

## 2025-06-30 DIAGNOSIS — I10 PRIMARY HYPERTENSION: ICD-10-CM

## 2025-06-30 DIAGNOSIS — E78.5 HYPERLIPIDEMIA, UNSPECIFIED HYPERLIPIDEMIA TYPE: ICD-10-CM

## 2025-06-30 DIAGNOSIS — E66.01 CLASS 3 SEVERE OBESITY WITH SERIOUS COMORBIDITY AND BODY MASS INDEX (BMI) OF 40.0 TO 44.9 IN ADULT, UNSPECIFIED OBESITY TYPE: Primary | ICD-10-CM

## 2025-06-30 PROCEDURE — 99999 PR PBB SHADOW E&M-EST. PATIENT-LVL IV: CPT | Mod: PBBFAC,,, | Performed by: INTERNAL MEDICINE

## 2025-06-30 RX ORDER — TOPIRAMATE 25 MG/1
25 TABLET, FILM COATED ORAL 2 TIMES DAILY
Qty: 60 TABLET | Refills: 1 | Status: SHIPPED | OUTPATIENT
Start: 2025-06-30 | End: 2026-06-30

## 2025-06-30 NOTE — PATIENT INSTRUCTIONS
PLEASE ARRIVE 15-20 min EARLY FOR YOUR APPOINTMENTS. This allows us to perform all of the services for your appointment in a timely fashion. Arriving late for your appointment not only decreases the time available for the doctor to see you, it also leads to delays for every other patient scheduled after you. Patients arriving after their appointment time may be asked to wait until all patients that arrived on time are seen, and/or there is another available slot for you to be worked in. When this is not possible, you may be asked to reschedule.   Thank you for your consideration in this matter.       If you are interested in bariatric surgery we will need you to  attend an  online seminar. If you choose to view the online seminar please go to: [email or web address] . The seminar is best viewed on a desktop or laptop computer. Upon completion of the seminar on the last slide you will see the code word comprised of letters and numbers in red and you should jot them down as youll need them to enter into the required form. On that same page youll see the link which will take you to the form. Please enter all the information required, including the code word. You will receive an email confirmation and so will we. Upon receiving this letter you will be called so that your appointments can be arranged. There is also two links for you to print out two packets of information. One is the patient information packet and the other is the nutrition worksheet. Please complete them and bring to your next appointment in our department.       Patient was informed that topiramate is used for migraine prevention and seizures. Weight loss is a common side effect that is well documented. S/he understands this. S/he was informed of the potential side effects such as serious and possibly fatal rash in which case the medication should be discontinued immediately. Paresthesias, forgetfulness, fatigue, kidney stones, GI symptoms, and  "changes in lab values such as electrolytes, blood counts and kidney function.      Start topiramate  in the evening for 1 week, then morning and evening.     Discussed caution with medication. Report any vision changes immediately.       Increase low impact activity as tolerated.  Avoid high impact activity, very heavy lifting or other exercise regimens that may cause significant discomfort.     Add some type of resistance training 2-3 days a week. These can be body weight exercises, light weight or elastic bands. Youtube and [employer] are great sources for free work out plans and videos.       Patient counseled in strategies for long term weight loss and maintenance: Keeping a food diary, exercise for 1 hour a day and eating more protein than carbohydrates.       No soda, sweet tea, juices or lemonade. All drinks should be 5 calories or less.           1200 calorie Meal Plan  STARCHES 80 CALORIES PER SERVING 15g CARB, 3g PROTEIN, 1g FAT  Servings per day   bread   tortilla   crackers   cooked cereals   dry cereals   pasta   rice   corn   popcorn   potato (small)   potato, mashed   sweet potato   squash, winter   cooked beans, peas, lentils (add 1 meat exchange)   1 slice   1 (6")   4-6 (3/4 oz)   1/2 cup   3/4 cup   1/2 cup   1/3 cup  1/2 cup   1 small light bag  1 (3 oz)   1/2 cup   1/3 cup   1 cup   1/2 cup Most starches are a good source of B vitamins   Choose whole grain foods such as 100% whole wheat bread and flour, brown rice, tortillas, etc. for nutrients and fiber.   Combine beans (starch & meat) with grains (starch) for their complimentary proteins and fiber   Combine grains (starch) with milk (milk) or cheese (meat) to compliment proteins     3   FRUIT 60 CALORIES PER SERVING 15g CARB    fresh fruit   banana  melon (cubes)   berries  canned fruit   dried fruit    1 small   1/2  ½ cup   ¾ cup  ½ cup   ¼ cup    Choose whole fruits for fiber   No fruit juices   2   DAIRY  CALORIES PER SERVING 12g " CARB, 8g PROTEIN, 0-8g FAT    milk   yogurt  Protein soy or almond milk 1 cup   1 cup   1 cup Use unsweetened almond or soy milk with added protein  Avoid chocolate or flavored milk  Avoid yogurt with more than 8 gms of sugar. Gms of protein should be higher than grams of sugar               2   MEAT AND SUBSTITUES  CALORIES PER SERVING 7g Protein, 0-13g FAT    Seafood,meat and fish   cheese   cottage cheese   egg   peanut butter   tofu or tempeh  cooked beans, peas, lentils (add 1 starch)  Quinoa (add 1 starch)   Nuts and seeds (½ serving protein + 1 fat)  Nutritional yeast  Morning Star grillers 1 oz       1 oz  1/4 cup     1   1.5 Tbsp   4 oz (1/2 cup)   1/2 cup              ¼ cup            2 tablespoons    1 patrick or ½ cup      Choose fish, seafood and lower fat cheeses  Limit frying or adding fat.      9   FATS 45 CALORIES PER SERVING     oil   mayonnaise   cream cheese   salad dressing   peanuts   avocado   butter or margarine    1 tsp   1 tsp   1 Tbsp   1 Tbsp   10   1/8   1 tsp Eat less fat.   Eat less saturated fat such as animal fat found in fatter meat, cheese, and butter. Also eat less hydrogenated fat.      2-3   VEGETABLES 25 CALORIES PER SERVING 5 g CARB, 2g PROTEIN    raw vegetables   cooked vegetables   tomato or vegetable juice 1 cup   1/2 cup     1/2 cup Choose dark green leafy and deep yellow vegetables such as spinach, zuchinni, squash, mushrooms, cauliflower ,broccoli, carrots, and peppers.   Unlimited       VEGETARIAN 1200 CALORIE MEAL PLAN  Eat 3 small meals per day with 1-2 small snacks to keep you full throughout the day  Aim for at least 15 gms of protein at breakfast, at least 25 grams at lunch and at least 25 grams of protein at dinner.  Snacks should contain at least 5 grams of protein  Limit starches to 1 serving per meal or snack.  Keep your carbs whole grain or whole wheat  Limit your intake of refined sugar including sugary beverages ie sweet tea, lemonade, fruit  punch             Fruit Protein Dairy Starch Fats Calories   Breakfast 1 2 1 1  340   Lunch  3  1 1 290   Dinner 1 4  1 1 405   Snack   1 1  170   Total 2 9 2 4 2 1205     Sample breakfasts:  2 scrambled eggs (use spray), 1 cup Protein Silk soy milk, 1 slice whole wheat toast, 1 small orange  Omelet made with 1 egg, 1-ounce low fat cheese and non-starchy veggies, 1 low fat plain yogurt with ½ banana  Plain yogurt with ¾ cup unsweetened cold cereal and ½ cup fresh fruit salad, 2 hardboiled eggs  Sample lunches:  ½ whole wheat bagel topped with 3 ounces of low fat cheese melted in oven with a wild green salad with 1 TBSP dressing  ¾ cup cooked beans with 6 whole grain crackers, 10 roasted peanuts  ½ medium baked potato with 3 ounces of low fat cheddar cheese and 1 TBSP  sour cream  1 small wheat tortilla brushed with 1 tsp olive oil then brushed with pizza sauce and 4 ounces low fat cheese, sliced mushrooms and green peppers broiled until cheese is melted.  ½ cup chopped melon    Sample Dinners:  4 ounces of tuna pan seared in 1 tsp olive oil, ½ baked small sweet potato and 2 small plums  ½ cup chick peas, 1 cup cauliflower sauteed with 1 tbsp chopped onion, 1 tsp oil, and 2 tsp robins powder. Add low sodium vegetable stock to desired consistency. Serve with ½ cup brown rice  Red beans seasoned with onions and bell peppers served over cauliflower rice  1 cup cooked green or brown lentils served with ½ cup cooked quinoa and chopped tomatoes and cucumbers and 1 tbsp feta cheese  Sample Snacks:  1 cup of Protein Silk Soy milk with 3 squares iram crackers  3/4 ounce Triscuits with 1 ounce cubed cheese  Chobani Triple Zero yogurt with ¾ cup unsweetened cereal  ½ cup edamame (shelled)    Meal Ideas for Regular Bariatric Diet  *Recipes and products available at [email or web address]      Breakfast: (15-20g protein)    - Egg white omelet: 2 egg whites or ½ cup Egg Beaters. (Optional proteins: cheese, shrimp, black beans,  chicken, sliced turkey) (Optional veggies: tomatoes, salsa, spinach, mushrooms, onions, green peppers, or small slice avocado)     - Egg and sausage: 1 egg or ¼ cup Egg Beaters (any variety), with 1 patrick or 2 links of Turkey sausage or Veggie breakfast sausage (Bux180 or [employer])    - Crust-less breakfast quiche: To make a glass pie dish, mix 4oz part skim Ricotta, 1 cup skim milk, and 2 eggs as your base. Add protein: shredded cheese, sliced lean ham or turkey, turkey mitchell/sausage. Add veggies: tomato, onion, green onion, mushroom, green pepper, spinach, etc.    - Yogurt parfait: Mix 1 - 6oz container Dannon Light N Fit vanilla yogurt, with ¼ cup Kashi Go Lean cereal    - Cottage cheese and fruit: ½ cup part-skim cottage cheese or ricotta cheese topped with fresh fruit or sugar free preserves     - Anais Aranda's Vanilla Egg custard* (add 2 Tbsp instant coffee granules to make Cappuccino Custard*)    - Hi-Protein café latte (skim milk, decaf coffee, 1 scoop protein powder). Optional to add Sugar free syrup or extract flavoring.    Lunch: (20-30g protein)    - ½ cup Black bean soup (Homemade or Progresso), with ¼ cup shredded low-fat cheese. Top with chopped tomato or fresh salsa.     - Lean deli turkey breast and low-fat sliced cheese, mustard or light varner to moisten, rolled up together, or wrapped in a Rolando lettuce leaf    - Chicken salad made from dinner leftovers, moisten with low-fat salad dressing or light varner. Also try leftover salmon, shrimp, tuna or boiled eggs. Serve ½ cup over dark green salad    - Fat-free canned refried beans, topped with ¼ cup shredded low-fat cheese. Top with chopped tomato or fresh salsa.     - Greek salad: Top mixed greens with 1-2oz grilled chicken, tomatoes, red onions, 2-3 kalamata olives, and sprinkle lightly with feta cheese. Spritz with Balsamic vinegar to taste.     - Crust-less lunch quiche: To make a glass pie dish, mix 4oz part skim Ricotta, 1 cup skim milk,  and 2 eggs as your base. Add protein: shredded cheese, sliced lean ham or turkey, shrimp, chicken. Add veggies: tomato, onion, green onion, mushroom, green pepper, spinach, artichoke, broccoli, etc.    - Pizza bake: tomato sauce, low-fat shredded mozzarella and turkey pepperoni or Brazilian mitchell. Add any veggies.    - Cucumber crab bites: Spread ¼ cup crab dip (lump crabmeat + light cream cheese and green onions) over sliced cucumber.     - Chicken with light spinach and artichoke dip*: Puree in : 6oz cooked and drained spinach, 2 cloves garlic, 1 can cannelloni beans, ½ cup chopped green onions, 1 can drained artichoke hearts (not marinated in oil), lemon juice and basil. Mix in 2oz chopped up chicken.    Supper: (20-30g protein)    - Serve grilled fish over dark green salad tossed with low-fat dressing, served with grilled asparagus jones     - Rotisserie chicken salad: served with sliced strawberries, walnuts, fat-free feta cheese crumbles and 1 tbsp Velazquezs Own Light Raspberry Empire Vinaigrette    - Shrimp cocktail: Dip cold boiled shrimp in homemade low-sugar cocktail sauce (1/2 cup Raven One Carb ketchup, 2 tbsp horseradish, 1/4 tsp hot sauce, 1 tsp Worcestershire sauce, 1 tbsp freshly-squeezed lemon juice). Serve with dark green salad, walnuts, and crumbled blue cheese drizzled with olive oil and Balsamic vinegar    - Tuna Melt: Spread tuna salad onto 2 thick slices of tomato. Top with low-fat cheese and broil until cheese is melted. May also be made with chicken salad of shrimp salad. [place] with different types of cheeses.    - Homemade low-fat Chili using extra lean ground beef or ground turkey. Top with shredded cheese and salsa as desired. May add dollop fat-free sour cream if desired    - Dinner Omelet with shrimp or chicken and onion, green peppers and chives.    - No noodle lasagna: Use sliced zucchini or eggplant in place of noodles.  Layer with part skim ricotta cheese and low  sugar meat sauce (use very lean ground beef or ground turkey).    - Mexican chicken bake: Bake chunks of chicken breast or thigh with taco seasoning, Pace brand enchilada sauce, green onions and low-fat cheese. Serve with ¼ cup black beans or fat free refried beans topped with chopped tomatoes or salsa.    - Milad frozen meatballs, simmered in Classico Marinara sauce. Different flavors of salsa or spaghetti sauce create different dishes! Sprinkle with parmesan cheese. Serve with grilled or steamed veggies, or a dark green salad.    - Simmer boneless skinless chicken thigh chunks in Classico Marinara sauce or roasted salsa until tender with chopped onion, bell pepper, garlic, mushrooms, spinach, etc.     - Hamburger, without the bun, dressed the way you like. Served with grilled or steamed veggies.    - Eggplant parmesan: Bake slices of eggplant at 350 degrees for 15 minutes. Layer tomato sauce, sliced eggplant and low-fat mozzarella cheese in a baking dish and cover with foil. Bake 30-40 more minutes or until bubbly. Uncover and bake at 400 degrees for about 15 more minutes, or until top is slightly crisp.    - Fish tacos: grilled/baked white fish, wrapped in Rolando lettuce leaf, topped with salsa, shredded low-fat cheese, and light coleslaw.    Snacks: (100-200 calories; >5g protein)    - 1 low-fat cheese stick with 8 cherry tomatoes or 1 serving fresh fruit  - 4 thin slices fat-free turkey breast and 1 slice low-fat cheese  - 4 thin slices fat-free honey ham with wedge of melon  - 1/4 cup unsalted nuts with ½ cup fruit  - 6-oz container Dannon Light n Fit vanilla yogurt, topped with 1oz unsalted nuts         - apple, celery or baby carrots spread with 2 Tbsp natural peanut butter or almond butter   - apple slices with 1 oz slice low-fat cheese  - celery, cucumber, bell pepper or baby carrots dipped in ¼ cup hummus bean spread or light spinach and artichoke dip (*recipe in lunch section)  - 100 calorie bag  microwave light popcorn with 3 tbsp grated parmesan cheese  - Onel Links Beef Steak - 14g protein! (similar to beef jerky)  - 2 wedges Laughing Cow - Light Herb & Garlic Cheese with sliced cucumber or green bell pepper  - 1/2 cup low-fat cottage cheese with ¼ cup fruit or ¼ cup salsa  - RTD Protein drinks: Atkins, Low Carb Slim Fast, EAS light, Muscle Milk Light, etc.  - Homemade Protein drinks: [facility] Soy95, Isopure, Nectar, UNJURY, Whey Gourmet, etc. Mix 1 scoop powder with 8oz skim/1% milk or light soymilk.  - Protein bars: Atkins, EAS, Pure Protein, Think Thin, Detour, etc. Must have 0-4 grams sugar - Read the label.    Takeout Options: No more than twice/week  Deli - Salads (no pasta or rice), meats, cheeses. Roasted chicken. Lox (salmon)    Mexican - Platters which don't include tortillas, chips, or rice. Go easy on the beans. Example: Fajitas without the tortillas. Ask the  not to bring chips to the table if they are too tempting.    Greek - Meat or fish and vegetable, but no bread or rice. Including hummus, baba ganoush, etc, is OK. Most sit-down Greek restaurants can provide you with cucumber slices for dipping instead of leigh bread.    Fast Food (Avoid as much as possible) - Salads (no croutons and limit salad dressing to 2 tbsp), grilled chicken sandwich without the bun and ask for no varner. Lizeths low fat chili or Taco Bell pintos and cheese.    BBQ - The meats are fine if you ask for sauces on the side, but most of the traditional side dishes are loaded with carbs. Gonzalo slaw, baked beans and BBQ sauce are typically made with sugar.    Chinese - Nothing deep-fried, no rice or noodles. Many Chinese sauces have starch and sugar in them, so you'll have to use your judgement. If you find that these sauces trigger cravings, or cause Dumping, you can ask for the sauce to be made without sugar or just use soy sauce.        Exercise should be some cardiovascular and some resistance training(see  below).      STRENGTHENING  An adequate resistance training program could include the following types of exercise, focusing on the major muscle groups. One can use 5 to 10 pound dumbbells for those exercises that require the use of weight. At home, one can use a household item that provides a comfortable weight, such as a milk carton or beverage container. These exercises can also be performed without weights. Add some type of resistance training 2-3 days a week. These can be body weight exercises, light weight or elastic bands. [employer] and [employer] are great sources for free work out plans and videos.       Chest (Bench Press): Hold the weights with your hands. Lying on a flat surface, with knees bent and feet flat, slowly bring the weights to the chest area with palms facing upward. Begin to exhale and press the weights up, fully extending the arms, and keeping them above your eyes. Inhale as you lower them to the starting position and repeat the movement.  Back (Bent-Over Row): Start by placing your feet shoulder-width apart.  a weight with each hand just outside the knees. Keeping the back straight and the knees flexed, slightly bend forward at the waist. Let the arms hang naturally while holding the weights. From this starting position, pull the weights to the lower abdomen, keeping the elbows close to the body. Exhale as you pull. Return to the starting position, inhaling as you go.  Arms (Bicep Curls): Hold a weight in each hand, with elbows at your sides, palms facing forward. The back should be straight, the chest flared outward. Begin to bend your right arm up first while exhaling, keeping the elbow totally stationary. Wait until the right arm goes completely down to the fully extended position, and then begin to curl the left arm. Each arm curled completes one full repetition.  Shoulders (Lateral Raises): Place the feet a few inches apart with the knees bent slightly. Keep the back erect as you lean  "forward slightly. With the weights in front of your thighs and palms facing together, begin to slowly raise them up to the side until parallel with the floor. Lower the weights to your thighs, and repeat.  Legs (Stiff Leg Dead Lift): Start by standing straight, holding the weights close to your sides, nearly touching your thighs. Keep the weights close to the body--this protects the back. The back must stay straight. Bend at the waist as far forward as you comfortably can while keeping your legs straight, and begin to feel the pull in your hamstrings as you lower the weights toward the ground. Slowly return to the starting position, keeping the back straight.  Legs (Dumbbell Lunge): Hold a weight in each hand, arms hanging at your sides. Step out with one leg, keeping the back straight. It is important to step out far enough so that the knee does not extend over the toe. This puts too much stress on the knee. Go down far enough so that the opposite knee nearly touches the ground. Keep this stance and repeat the lunging motion for several repetitions.  Abdominals: Do not perform standard sit-ups as these could hurt the lower back. Rather, focus on the following 2 exercises: (1) Obliques--Lie on your back with the knees flexed in the bent sit-up position. From this position, bring both knees down to the ground. With the back remaining flat, begin to flex your body toward your toes ("crunch"). Bring your shoulders up off the ground, but go slowly, controlling your momentum. Repeat this for 10 to 15 times. (2) Seated bench kicks/pj knives--Sit on the end of a bench or chair with the hands placed behind the buttocks. Begin to kick the legs outward with the knees bent slightly--at the same time, lean back to extend the torso. Come back to the beginning position and repeat this motion 10 to 15 times.        "

## (undated) DEVICE — LABEL CORRECT MEDICATION SYS

## (undated) DEVICE — DRAPE THREE-QTR REINF 53X77IN

## (undated) DEVICE — GLOVE SENSICARE PI SLT 7.5

## (undated) DEVICE — CHLORAPREP 10.5 ML APPLICATOR

## (undated) DEVICE — SYR 10CC LUER LOCK

## (undated) DEVICE — KIT NERVE BLOCK PREP BAPTIST